# Patient Record
Sex: FEMALE | NOT HISPANIC OR LATINO | Employment: FULL TIME | URBAN - METROPOLITAN AREA
[De-identification: names, ages, dates, MRNs, and addresses within clinical notes are randomized per-mention and may not be internally consistent; named-entity substitution may affect disease eponyms.]

---

## 2021-04-18 ENCOUNTER — OFFICE VISIT (OUTPATIENT)
Dept: URGENT CARE | Facility: URGENT CARE | Age: 21
End: 2021-04-18
Payer: COMMERCIAL

## 2021-04-18 VITALS
HEIGHT: 67 IN | TEMPERATURE: 99.7 F | DIASTOLIC BLOOD PRESSURE: 70 MMHG | BODY MASS INDEX: 23.54 KG/M2 | OXYGEN SATURATION: 97 % | RESPIRATION RATE: 16 BRPM | SYSTOLIC BLOOD PRESSURE: 110 MMHG | WEIGHT: 150 LBS | HEART RATE: 82 BPM

## 2021-04-18 DIAGNOSIS — J02.0 STREP PHARYNGITIS: Primary | ICD-10-CM

## 2021-04-18 LAB
DEPRECATED S PYO AG THROAT QL EIA: POSITIVE
SPECIMEN SOURCE: ABNORMAL

## 2021-04-18 PROCEDURE — 87880 STREP A ASSAY W/OPTIC: CPT | Performed by: FAMILY MEDICINE

## 2021-04-18 PROCEDURE — 99203 OFFICE O/P NEW LOW 30 MIN: CPT | Performed by: FAMILY MEDICINE

## 2021-04-18 RX ORDER — PENICILLIN V POTASSIUM 500 MG/1
500 TABLET, FILM COATED ORAL 2 TIMES DAILY
Qty: 20 TABLET | Refills: 0 | Status: SHIPPED | OUTPATIENT
Start: 2021-04-18 | End: 2021-04-28

## 2021-04-18 RX ORDER — ISOTRETINOIN 40 MG/1
40 CAPSULE ORAL 2 TIMES DAILY
COMMUNITY
End: 2023-05-17

## 2021-04-18 ASSESSMENT — MIFFLIN-ST. JEOR: SCORE: 1478.03

## 2021-04-18 NOTE — LETTER
WORK NOTE    Date: 4/18/2021      Name: Elsi Eagle                       YOB: 2000    Medical Record Number: [id number]    The patient was seen today at: [facility]    Patient seen today in clinic.  Due to infection- patient needs to remain off work until Tuesday AM.        _________________________  April Damon MD

## 2021-04-18 NOTE — PROGRESS NOTES
"    Assessment & Plan     Strep pharyngitis    - Streptococcus A Rapid Scr w Reflx to PCR  - penicillin V (VEETID) 500 MG tablet; Take 1 tablet (500 mg) by mouth 2 times daily for 10 days    +strep.  Treat with PEN VK bid x 10 days.  Increased fluids and rest.  Follow-up if no change or worsening sx.  Work note provided.    20 minutes spent on the date of the encounter doing chart review, review of test results, interpretation of tests, patient visit and documentation     April Damon MD  [facility]    Vahid Calzada is a 20 year old who presents for the following health issues     HPI     ST x 24 hours.  B ear pain.  No fever or chills.  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      Review of Systems   Constitutional, HEENT, cardiovascular, pulmonary, GI, , musculoskeletal, neuro, skin, endocrine and psych systems are negative, except as otherwise noted.      Objective    /70   Pulse 82   Temp 99.7  F (37.6  C) (Oral)   Resp 16   Ht 1.702 m (5' 7\")   Wt 68 kg (150 lb)   LMP 04/11/2021   SpO2 97%   Breastfeeding No   BMI 23.49 kg/m    Body mass index is 23.49 kg/m .  Physical Exam   GENERAL: healthy, alert and no distress  EYES: Eyes grossly normal to inspection, PERRL and conjunctivae and sclerae normal  HENT: ear canals and TM's normal, + erythema of posterior pharynx  NECK: no adenopathy, no asymmetry, masses, or scars and thyroid normal to palpation  MS: no gross musculoskeletal defects noted, no edema  PSYCH: mentation appears normal, affect normal/bright    Results for orders placed or performed in visit on 04/18/21 (from the past 24 hour(s))   Streptococcus A Rapid Scr w Reflx to PCR    Specimen: Throat   Result Value Ref Range    Strep Specimen Description Throat     Streptococcus Group A Rapid Screen Positive (A) NEG^Negative               "

## 2021-07-09 ENCOUNTER — OFFICE VISIT (OUTPATIENT)
Dept: URGENT CARE | Facility: URGENT CARE | Age: 21
End: 2021-07-09
Payer: COMMERCIAL

## 2021-07-09 VITALS
HEIGHT: 67 IN | RESPIRATION RATE: 12 BRPM | BODY MASS INDEX: 23.54 KG/M2 | WEIGHT: 150 LBS | SYSTOLIC BLOOD PRESSURE: 110 MMHG | TEMPERATURE: 98.1 F | HEART RATE: 70 BPM | DIASTOLIC BLOOD PRESSURE: 70 MMHG

## 2021-07-09 DIAGNOSIS — L50.9 HIVES: Primary | ICD-10-CM

## 2021-07-09 LAB
DEPRECATED S PYO AG THROAT QL EIA: NEGATIVE
SPECIMEN SOURCE: NORMAL
SPECIMEN SOURCE: NORMAL
STREP GROUP A PCR: NOT DETECTED

## 2021-07-09 PROCEDURE — 99213 OFFICE O/P EST LOW 20 MIN: CPT | Mod: 25 | Performed by: INTERNAL MEDICINE

## 2021-07-09 PROCEDURE — 87651 STREP A DNA AMP PROBE: CPT | Performed by: INTERNAL MEDICINE

## 2021-07-09 PROCEDURE — 96372 THER/PROPH/DIAG INJ SC/IM: CPT | Performed by: INTERNAL MEDICINE

## 2021-07-09 RX ORDER — PREDNISONE 20 MG/1
40 TABLET ORAL DAILY
Qty: 10 TABLET | Refills: 0 | Status: SHIPPED | OUTPATIENT
Start: 2021-07-09 | End: 2021-07-14

## 2021-07-09 RX ORDER — METHYLPREDNISOLONE SODIUM SUCCINATE 40 MG/ML
40 INJECTION, POWDER, LYOPHILIZED, FOR SOLUTION INTRAMUSCULAR; INTRAVENOUS ONCE
Status: COMPLETED | OUTPATIENT
Start: 2021-07-09 | End: 2021-07-09

## 2021-07-09 RX ORDER — METRONIDAZOLE 250 MG/1
250 TABLET ORAL 3 TIMES DAILY
COMMUNITY
End: 2023-05-17

## 2021-07-09 RX ADMIN — METHYLPREDNISOLONE SODIUM SUCCINATE 40 MG: 40 INJECTION, POWDER, LYOPHILIZED, FOR SOLUTION INTRAMUSCULAR; INTRAVENOUS at 14:30

## 2021-07-09 ASSESSMENT — MIFFLIN-ST. JEOR: SCORE: 1478.03

## 2021-07-09 NOTE — PATIENT INSTRUCTIONS
Screen strep    treatment for hives with steroid shot & start oral prednisone 40 mg 5 day burst tomorrow.  Zyrtec 10 mg daily 5 days.  Then benadryl 50 mg at night 5 days    Unsure etiology  Screen strep  Viral  Allergic reaction - foods, etc    Read handout     Recheck primary 1-2 weeks      Patient Education     Hives (Adult)  Hives are pink or red bumps on the skin. These bumps are also known as wheals. The bumps can itch, burn, or sting. Hives can occur anywhere on the body. They vary in size and shape and can form in clusters. Individual hives can appear and go away quickly. New hives may develop as old ones fade. Hives are common and usually harmless. They are not contagious. Occasionally, hives are a sign of a serious allergy.   Hives are often caused by an allergic reaction. They may occur from:     Certain foods, such as shellfish, nuts, tomatoes, or berries    Contact with something in the environment, such as pollens, animals, or mold    Certain medicines    Sun or cold air    Viral infections, such as a cold, the flu, or strep throat  If the hives continue to come and go over many weeks without any other symptoms (chronic hives), the cause may be very hard to figure out.   You may be prescribed medicines to ease swelling and itching. Follow all instructions when using these medicines. The hives will usually fade in a few days. But they can last for weeks or months.   Home care   Follow these tips:    Try to find the cause of the hives and eliminate it. Discuss possible causes with your healthcare provider. Your healthcare provider may ask you to keep track of the food you eat and your lifestyle to help find the cause of the hives.    Don t scratch the hives. Scratching will delay healing. To reduce itching, apply cool, wet compresses to the skin.    Dress in soft, loose cotton clothing.    Don t bathe in hot water. This can make the itching worse.    Apply an ice pack or cool pack wrapped in a thin towel  to your skin. This will help reduce redness and itching. But if your hives were caused by exposure to cold, then do not apply more cold to them.    You may use over-the counter antihistamines to reduce itching. Some older antihistamines, such as diphenhydramine and chlorpheniramine, are inexpensive. But they need to be taken often and may make you sleepy. They are best used at bedtime. Don t use diphenhydramine if you have glaucoma or have trouble urinating because of an enlarged prostate. Newer antihistamines, such as loratadine, cetirizine, levocetirizine, and fexofenadine, are generally more expensive. But they tend to have fewer side effects. They can be taken less often.    Another type of antihistamine is used to treat heartburn. This type includes nizatidine, famotidine, and cimetidine. These are sometimes used along with the above antihistamines if a single medicine is not working.    If the hives are severe and you do not respond well to other medicines, you may be given a steroid, such as prednisone, to take for a short time. Follow all instructions carefully when taking this medicine. Tell your healthcare provider about any side effects.  Follow-up care   Follow up with your healthcare provider if your symptoms don't get better in 2 days. Ask your provider about allergy testing if you have had a severe reaction or have had several episodes of hives. Allergy testing may help figure out what you are allergic to. You may need blood tests, a urine test, or skin tests.   When to seek medical advice   Call your healthcare provider right away if any of these occur:     Fever of 100.4 F (38.0 C) or higher, or as directed by your healthcare provider    Redness, swelling, or pain    Foul-smelling fluid coming from the rash  Call 911  Call 911 if any of the following occur:     Swelling of the face, throat, or tongue    Trouble breathing or swallowing    Dizziness, weakness, or fainting  Prasanth last reviewed this  educational content on 6/1/2019 2000-2021 [employer]. All rights reserved. This information is not intended as a substitute for professional medical care. Always follow your healthcare professional's instructions.

## 2021-07-09 NOTE — PROGRESS NOTES
ASSESSMENT AND PLAN:      ICD-10-CM    1. Hives  L50.9 Streptococcus A Rapid Scr w Reflx to PCR     predniSONE (DELTASONE) 20 MG tablet     methylPREDNISolone sodium succinate (solu-MEDROL) injection 40 mg     Group A Streptococcus PCR Throat Swab       Patient Instructions   Screen strep    treatment for hives with steroid shot & start oral prednisone 40 mg 5 day burst tomorrow.  Zyrtec 10 mg daily 5 days.  Then benadryl 50 mg at night 5 days    Unsure etiology  Screen strep  Viral  Allergic reaction - foods, etc    Read handout     Recheck primary 1-2 weeks      Patient Education     Hives (Adult)  Hives are pink or red bumps on the skin. These bumps are also known as wheals. The bumps can itch, burn, or sting. Hives can occur anywhere on the body. They vary in size and shape and can form in clusters. Individual hives can appear and go away quickly. New hives may develop as old ones fade. Hives are common and usually harmless. They are not contagious. Occasionally, hives are a sign of a serious allergy.   Hives are often caused by an allergic reaction. They may occur from:     Certain foods, such as shellfish, nuts, tomatoes, or berries    Contact with something in the environment, such as pollens, animals, or mold    Certain medicines    Sun or cold air    Viral infections, such as a cold, the flu, or strep throat  If the hives continue to come and go over many weeks without any other symptoms (chronic hives), the cause may be very hard to figure out.   You may be prescribed medicines to ease swelling and itching. Follow all instructions when using these medicines. The hives will usually fade in a few days. But they can last for weeks or months.   Home care   Follow these tips:    Try to find the cause of the hives and eliminate it. Discuss possible causes with your healthcare provider. Your healthcare provider may ask you to keep track of the food you eat and your lifestyle to help find the cause of the  hives.    Don t scratch the hives. Scratching will delay healing. To reduce itching, apply cool, wet compresses to the skin.    Dress in soft, loose cotton clothing.    Don t bathe in hot water. This can make the itching worse.    Apply an ice pack or cool pack wrapped in a thin towel to your skin. This will help reduce redness and itching. But if your hives were caused by exposure to cold, then do not apply more cold to them.    You may use over-the counter antihistamines to reduce itching. Some older antihistamines, such as diphenhydramine and chlorpheniramine, are inexpensive. But they need to be taken often and may make you sleepy. They are best used at bedtime. Don t use diphenhydramine if you have glaucoma or have trouble urinating because of an enlarged prostate. Newer antihistamines, such as loratadine, cetirizine, levocetirizine, and fexofenadine, are generally more expensive. But they tend to have fewer side effects. They can be taken less often.    Another type of antihistamine is used to treat heartburn. This type includes nizatidine, famotidine, and cimetidine. These are sometimes used along with the above antihistamines if a single medicine is not working.    If the hives are severe and you do not respond well to other medicines, you may be given a steroid, such as prednisone, to take for a short time. Follow all instructions carefully when taking this medicine. Tell your healthcare provider about any side effects.  Follow-up care   Follow up with your healthcare provider if your symptoms don't get better in 2 days. Ask your provider about allergy testing if you have had a severe reaction or have had several episodes of hives. Allergy testing may help figure out what you are allergic to. You may need blood tests, a urine test, or skin tests.   When to seek medical advice   Call your healthcare provider right away if any of these occur:     Fever of 100.4 F (38.0 C) or higher, or as directed by your  "healthcare provider    Redness, swelling, or pain    Foul-smelling fluid coming from the rash  Call 911  Call 911 if any of the following occur:     Swelling of the face, throat, or tongue    Trouble breathing or swallowing    Dizziness, weakness, or fainting  Prasanth last reviewed this educational content on 6/1/2019 2000-2021 [employer]. All rights reserved. This information is not intended as a substitute for professional medical care. Always follow your healthcare professional's instructions.             Return in about 1 week (around 7/16/2021).        Alexia Chavez MD  [facility]    Subjective     Elsi Eagle is a 20 year old who presents for Patient presents with:  Urgent Care  Derm Problem: rash on neck and all over except feet. Very itchy, started Tuesday.     an established patient of [facility].    Rash    Onset of rash was 3 day(s) ago.   Course of illness is worsening.    Current and Associated symptoms: itching and red   Location of the rash: lower legs , then to trunk & arms  today neck & face    Initially thought mosquito bites  Previous history of a similar rash? No  Recent exposure history: none known  Denies exposure to: dietary change, environmental allergens, new household products, new skincare products, strep and viral illness    Treatment measures tried include: none    No shortness of breath   No swelling tongue or throat sensation        Objective    /70   Pulse 70   Temp 98.1  F (36.7  C) (Oral)   Resp 12   Ht 1.702 m (5' 7\")   Wt 68 kg (150 lb)   LMP 05/14/2021   Breastfeeding No   BMI 23.49 kg/m    Physical Exam  Vitals signs reviewed.   Constitutional:       Appearance: Normal appearance. She is not ill-appearing.   Cardiovascular:      Rate and Rhythm: Normal rate and regular rhythm.      Pulses: Normal pulses.      Heart sounds: Normal heart sounds.   Pulmonary:      Effort: Pulmonary effort is normal.      Breath " sounds: Normal breath sounds. No wheezing.   Skin:     Findings: Rash present.      Comments: Multiple varying sizes of red patches, some coalescing.     Neurological:      Mental Status: She is alert.                    Results for orders placed or performed in visit on 07/09/21 (from the past 24 hour(s))   Streptococcus A Rapid Scr w Reflx to PCR    Specimen: Throat   Result Value Ref Range    Strep Specimen Description Throat     Streptococcus Group A Rapid Screen Negative NEG^Negative

## 2021-07-29 ENCOUNTER — HOSPITAL ENCOUNTER (EMERGENCY)
Facility: CLINIC | Age: 21
Discharge: HOME | End: 2021-07-29
Payer: COMMERCIAL

## 2021-07-29 VITALS
WEIGHT: 150 LBS | TEMPERATURE: 98.5 F | SYSTOLIC BLOOD PRESSURE: 101 MMHG | HEART RATE: 87 BPM | BODY MASS INDEX: 23.49 KG/M2 | OXYGEN SATURATION: 98 % | RESPIRATION RATE: 16 BRPM | DIASTOLIC BLOOD PRESSURE: 62 MMHG

## 2021-07-29 NOTE — ED TRIAGE NOTES
Pt is ~ 6 1/2 weeks pregnant. She has been taking medication to voluntarily interrupt this pregnancy. She started the med to open her cervix on Monday.  On Tuesday she took the other 4 pills. She had heavy bleeding earlier today, which she reports is moderate at this time. She tells me that her cramping is her major problem, it is not relieved with Ibuprofen 800 mg.

## 2021-07-29 NOTE — ED PROVIDER NOTES
"ED Provider Note  [facility]      History     Chief Complaint   Patient presents with     Vaginal Bleeding     Pt is ~ 6 1/2 weeks pregnant. She has been taking medication to voluntarily interrupt this pregnancy. She started the med to open her cervix on Monday.  On Tuesday she took the other 4 pills. She had heavy bleeding earlier today, which she reports is moderate at this time. She tells me that her cramping is her major problem, it is not relieved with Ibuprofen.      HPI  Elsi Eagle is a (6.5 weeks pregnant) 20 year old female with a PMH of adjustment disorder with depressed mood who presents to the ED today complaining of vaginal bleeding.  Patient is on a medication to voluntarily terminate this pregnancy. ***    Past Medical History  No past medical history on file.  No past surgical history on file.  ISOtretinoin (ACCUTANE) 40 MG capsule  metroNIDAZOLE (FLAGYL) 250 MG tablet      No Known Allergies  Family History  No family history on file.  Social History   Social History     Tobacco Use     Smoking status: Never Smoker     Smokeless tobacco: Never Used   Substance Use Topics     Alcohol use: Not on file     Drug use: Not on file      Past medical history, past surgical history, medications, allergies, family history, and social history were reviewed with the patient. No additional pertinent items.       Review of Systems  {Complete vs limited ROS:115421::\"A complete review of systems was performed with pertinent positives and negatives noted in the HPI, and all other systems negative.\"}    Physical Exam   BP: 101/62  Pulse: 87  Temp: 98.5  F (36.9  C)  Resp: 16  Height:  (5 ft 7 inches)  Weight: 68 kg (150 lb)  SpO2: 98 %  Physical Exam  ***  ED Course      Procedures       {ED Course Selections:885457}       No results found for any visits on 07/29/21.  Medications - No data to display     Assessments & Plan (with Medical Decision Making)   ***    I have reviewed the " nursing notes. I have reviewed the findings, diagnosis, plan and need for follow up with the patient.    New Prescriptions    No medications on file       Final diagnoses:   None       --  ***  [facility] EMER[facility]ENCY DEPARTMENT  7/29/2021

## 2021-09-10 ENCOUNTER — OFFICE VISIT (OUTPATIENT)
Dept: FAMILY MEDICINE | Facility: CLINIC | Age: 21
End: 2021-09-10
Payer: COMMERCIAL

## 2021-09-10 VITALS
WEIGHT: 151 LBS | DIASTOLIC BLOOD PRESSURE: 60 MMHG | TEMPERATURE: 98.2 F | SYSTOLIC BLOOD PRESSURE: 100 MMHG | HEART RATE: 73 BPM | OXYGEN SATURATION: 97 % | RESPIRATION RATE: 14 BRPM | BODY MASS INDEX: 23.65 KG/M2

## 2021-09-10 DIAGNOSIS — R07.0 THROAT PAIN: ICD-10-CM

## 2021-09-10 DIAGNOSIS — J02.0 STREPTOCOCCAL PHARYNGITIS: Primary | ICD-10-CM

## 2021-09-10 LAB — DEPRECATED S PYO AG THROAT QL EIA: POSITIVE

## 2021-09-10 PROCEDURE — 87880 STREP A ASSAY W/OPTIC: CPT | Performed by: PHYSICIAN ASSISTANT

## 2021-09-10 PROCEDURE — 99213 OFFICE O/P EST LOW 20 MIN: CPT | Performed by: PHYSICIAN ASSISTANT

## 2021-09-10 RX ORDER — AMOXICILLIN 500 MG/1
500 CAPSULE ORAL 2 TIMES DAILY
Qty: 20 CAPSULE | Refills: 0 | Status: SHIPPED | OUTPATIENT
Start: 2021-09-10 | End: 2021-09-20

## 2021-09-10 NOTE — PROGRESS NOTES
Assessment & Plan     Streptococcal pharyngitis  Throat pain  Rapid strep returned positive. Start Amoxicillin 500 mg BID x 10 days. Work note provided. Tylenol and/or ibuprofen PRN. Push fluids. Change toothbrush. Return to care if not improving or worsening.   - Streptococcus A Rapid Screen w/Reflex to PCR - Clinic Collect  - amoxicillin (AMOXIL) 500 MG capsule; Take 1 capsule (500 mg) by mouth 2 times daily for 10 days    Return for if not improving or worsening.    AISHWARYA Munoz [facility]    Vahid Calzada is a 20 year old who presents for the following health issues     HPI     RESPIRATORY SYMPTOMS      Duration: 2 days    Description  sore throat    Severity: moderate    Accompanying signs and symptoms: throat squeezing.     History (predisposing factors):  strep exposure    Precipitating or alleviating factors:   Waking up and going to sleep.None  Therapies tried and outcome:  Vitamin C and home remedy      Review of Systems   Constitutional, HEENT, cardiovascular, pulmonary, gi and gu systems are negative, except as otherwise noted.      Objective    /60 (BP Location: Left arm, Patient Position: Chair, Cuff Size: Adult Regular)   Pulse 73   Temp 98.2  F (36.8  C) (Tympanic)   Resp 14   Wt 68.5 kg (151 lb)   LMP 09/10/2021 (Exact Date)   SpO2 97%   Breastfeeding Unknown   BMI 23.65 kg/m    Body mass index is 23.65 kg/m .  Physical Exam  Vitals and nursing note reviewed.   Constitutional:       Appearance: Normal appearance.   HENT:      Head: Normocephalic and atraumatic.      Right Ear: Tympanic membrane, ear canal and external ear normal.      Left Ear: Tympanic membrane, ear canal and external ear normal.      Mouth/Throat:      Mouth: Mucous membranes are moist.      Pharynx: Posterior oropharyngeal erythema present. No oropharyngeal exudate.   Eyes:      Conjunctiva/sclera: Conjunctivae normal.   Cardiovascular:      Rate and Rhythm: Normal rate  and regular rhythm.      Heart sounds: Normal heart sounds.   Pulmonary:      Effort: Pulmonary effort is normal.      Breath sounds: Normal breath sounds.   Musculoskeletal:      Cervical back: Neck supple. No tenderness.   Neurological:      General: No focal deficit present.      Mental Status: She is alert.   Psychiatric:         Mood and Affect: Mood normal.         Behavior: Behavior normal.            Results for orders placed or performed in visit on 09/10/21   Streptococcus A Rapid Screen w/Reflex to PCR - Clinic Collect     Status: Abnormal    Specimen: Throat; Swab   Result Value Ref Range    Group A Strep antigen Positive (A) Negative

## 2021-09-10 NOTE — LETTER
[facility]  [address] [place] [address]  [phone]      September 10, 2021    RE:  Elsi Eagle                                                                                                                                                       [address]   [facility] [id number]            To whom it may concern:    Elsi SHEPPARD Jacintochin is under my professional care for strep pharyngitis. She should be away from work for two days while starting antibiotics.         Sincerely,        AISHWARYA Munoz [facility]

## 2021-09-10 NOTE — PATIENT INSTRUCTIONS
Patient Education     Pharyngitis: Strep (Confirmed)    You have had a positive test for strep throat. Strep throat is a contagious illness. It's spread by coughing, kissing, sharing glasses or eating utensils, or by touching others after touching your mouth or nose. Symptoms include throat pain that is worse with swallowing, aching all over, headache, swollen lymph nodes at the front of the neck, and red swollen tonsils sometimes with white patches and fever. It's treated with antibiotic medicine. This should help you start to feel better in 1 to 2 days.   Home care    Rest at home. Drink plenty of fluids so you won't get dehydrated.    No work or school for the first 2 days of taking the antibiotics. You can then return to school or work if you are feeling better, have been taking the antibiotic for at least 24 hours and don't have a fever.     Take antibiotic medicine for the full 10 days, even if you feel better. This is very important to ensure the infection is treated completely. It's also important to prevent medicine-resistant germs from developing. If you were given an antibiotic shot, you don't need any more antibiotics.    You may use acetaminophen or ibuprofen to control pain or fever, unless another medicine was prescribed for this. Talk with your healthcare provider before taking these medicines if you have chronic liver or kidney disease or if you have had a stomach ulcer or gastrointestinal bleeding.    Throat lozenges or sprays help reduce pain. Gargling with warm saltwater will also reduce throat pain. Dissolve 1/2 teaspoon of salt in 1 glass of warm water. This may be useful just before meals.     Soft foods and cool or warm fluids are best. Don't eat salty or spicy foods.    Follow-up care  Follow up with your healthcare provider or our staff if you don't get better over the next week.   When to get medical advice  Call your healthcare provider right away or get immediate medical care if any of  these occur:     Fever of 100.4 F (38 C) or higher, or as directed by your healthcare provider    New or worsening ear pain, sinus pain, or headache    Painful lumps in the back of neck    Stiff neck    Lymph nodes getting larger or becoming soft in the middle    You have trouble swallowing liquids or you can't open your mouth wide because of throat pain    Signs of dehydration. These include very dark urine or no urine, sunken eyes, and dizziness.    Noisy breathing    Muffled voice    Rash  Call 911  Call 911right away if you:     Have trouble breathing    Can't swallow or talk    Prevention  Here are steps you can take to help prevent an infection:     Wash your hands often with soap and clean, running water for at least 20 seconds.    Don t have close contact with people who have sore throats, colds, or other upper respiratory infections.    Don t smoke, and stay away from secondhand smoke.  [email or web address] last reviewed this educational content on 3/1/2020    5903-3907 [employer]. All rights reserved. This information is not intended as a substitute for professional medical care. Always follow your healthcare professional's instructions.

## 2022-06-01 ENCOUNTER — HOSPITAL ENCOUNTER (EMERGENCY)
Facility: CLINIC | Age: 22
Discharge: HOME | End: 2022-06-01
Attending: FAMILY MEDICINE | Admitting: FAMILY MEDICINE
Payer: COMMERCIAL

## 2022-06-01 VITALS
WEIGHT: 151 LBS | BODY MASS INDEX: 23.7 KG/M2 | HEIGHT: 67 IN | DIASTOLIC BLOOD PRESSURE: 62 MMHG | RESPIRATION RATE: 16 BRPM | TEMPERATURE: 98.1 F | OXYGEN SATURATION: 96 % | HEART RATE: 109 BPM | SYSTOLIC BLOOD PRESSURE: 108 MMHG

## 2022-06-01 DIAGNOSIS — L50.9 URTICARIA: ICD-10-CM

## 2022-06-01 DIAGNOSIS — Z3A.18 18 WEEKS GESTATION OF PREGNANCY: ICD-10-CM

## 2022-06-01 PROCEDURE — 76815 OB US LIMITED FETUS(S): CPT | Performed by: FAMILY MEDICINE

## 2022-06-01 PROCEDURE — 76815 OB US LIMITED FETUS(S): CPT | Mod: 26 | Performed by: FAMILY MEDICINE

## 2022-06-01 PROCEDURE — 96374 THER/PROPH/DIAG INJ IV PUSH: CPT | Performed by: FAMILY MEDICINE

## 2022-06-01 PROCEDURE — 99284 EMERGENCY DEPT VISIT MOD MDM: CPT | Mod: 25 | Performed by: FAMILY MEDICINE

## 2022-06-01 PROCEDURE — 96375 TX/PRO/DX INJ NEW DRUG ADDON: CPT | Performed by: FAMILY MEDICINE

## 2022-06-01 PROCEDURE — 99285 EMERGENCY DEPT VISIT HI MDM: CPT | Mod: 25 | Performed by: FAMILY MEDICINE

## 2022-06-01 PROCEDURE — 250N000011 HC RX IP 250 OP 636: Performed by: FAMILY MEDICINE

## 2022-06-01 RX ORDER — DIPHENHYDRAMINE HCL 25 MG
25 TABLET ORAL EVERY 6 HOURS PRN
Qty: 20 TABLET | Refills: 0 | Status: SHIPPED | OUTPATIENT
Start: 2022-06-01 | End: 2023-05-17

## 2022-06-01 RX ORDER — METHYLPREDNISOLONE 4 MG
TABLET, DOSE PACK ORAL
Qty: 21 TABLET | Refills: 0 | Status: SHIPPED | OUTPATIENT
Start: 2022-06-01 | End: 2023-05-17

## 2022-06-01 RX ORDER — METHYLPREDNISOLONE SODIUM SUCCINATE 125 MG/2ML
125 INJECTION, POWDER, LYOPHILIZED, FOR SOLUTION INTRAMUSCULAR; INTRAVENOUS ONCE
Status: COMPLETED | OUTPATIENT
Start: 2022-06-01 | End: 2022-06-01

## 2022-06-01 RX ORDER — DIPHENHYDRAMINE HYDROCHLORIDE 50 MG/ML
25 INJECTION INTRAMUSCULAR; INTRAVENOUS ONCE
Status: COMPLETED | OUTPATIENT
Start: 2022-06-01 | End: 2022-06-01

## 2022-06-01 RX ORDER — PRENATAL VIT/IRON FUM/FOLIC AC 27MG-0.8MG
1 TABLET ORAL DAILY
COMMUNITY
End: 2023-12-07

## 2022-06-01 RX ORDER — FAMOTIDINE 20 MG/1
20 TABLET, FILM COATED ORAL 2 TIMES DAILY
Qty: 10 TABLET | Refills: 0 | Status: SHIPPED | OUTPATIENT
Start: 2022-06-01 | End: 2022-06-06

## 2022-06-01 RX ADMIN — DIPHENHYDRAMINE HYDROCHLORIDE 25 MG: 50 INJECTION, SOLUTION INTRAMUSCULAR; INTRAVENOUS at 11:16

## 2022-06-01 RX ADMIN — FAMOTIDINE 20 MG: 10 INJECTION INTRAVENOUS at 11:32

## 2022-06-01 RX ADMIN — METHYLPREDNISOLONE SODIUM SUCCINATE 125 MG: 125 INJECTION, POWDER, FOR SOLUTION INTRAMUSCULAR; INTRAVENOUS at 11:15

## 2022-06-01 NOTE — ED PROVIDER NOTES
"ED Provider Note  [facility]      History     Chief Complaint   Patient presents with     Rash     Rash that initially started on her abdomen that has spread all over her body , rash is  very itchy and patient woke up this morning with puffy eyes.      HPI  Elsi Eagle is a healthy 21 year old female  at 18 weeks who presents with a spreading urticarial rash. Symptoms started spontaneously yesterday morning with rash on her abdomen that became increasingly itchy and spread to her back, legs, and chest over the course of the day. She had similar symptoms 1 year ago during a previous pregnancy and required prednisone while in the ED with symptom resolution. She is requesting medications again for symptom management.    PMH  Similar urticarial rash during pregnancy one year prior    Meds  Prenatal vitamin    Allergies  NKA     Review of Systems  Rash spreading from abdomen to chest, back, legs, and arms. Endorses headache, nausea, and urinary frequency associated with pregnancy. Denies fever, vomiting, diarrhea, constipation, change in appetite, fatigue, weakness.    Physical Exam   BP: 108/62  Pulse: 109  Temp: 98.1  F (36.7  C)  Resp: 16  Height: 170.2 cm (5' 7\")  Weight: 68.5 kg (151 lb)  SpO2: 96 %  Physical Exam  Constitutional:       Appearance: Normal appearance.   HENT:      Head: Normocephalic.      Mouth/Throat:      Mouth: Mucous membranes are moist.      Pharynx: Oropharynx is clear.   Eyes:      Conjunctiva/sclera: Conjunctivae normal.   Cardiovascular:      Rate and Rhythm: Normal rate and regular rhythm.      Pulses: Normal pulses.      Heart sounds: Normal heart sounds.   Pulmonary:      Effort: Pulmonary effort is normal.      Breath sounds: Normal breath sounds.   Abdominal:      General: Bowel sounds are normal.      Comments: Abdomen appropriately distended for pregnancy   Skin:     General: Skin is warm and dry.      Capillary Refill: Capillary refill takes " less than 2 seconds.      Coloration: Skin is not jaundiced.      Findings: Rash present.      Comments: Urticarial patches across lower abdomen, neck and face, upper and lower back, medial thighs, and shins   Neurological:      Mental Status: She is alert. Mental status is at baseline.   Psychiatric:         Behavior: Behavior normal.       ED Course      Procedures  Results for orders placed during the hospital encounter of 06/01/22    POC US OB TRANSABDOMINAL LIMITED    Impression  Limited Bedside Transabdominal ultrasound for evaluation of IUP  Performed any interpreted by me.    Indication: Urticarial rash, 18 weeks pregnant  Findings:  The lower abdomen was interrogated with a curvilinear probe. The uterus was identified.  Within the uterus there is a moving fetus with visible heart rate with FHR of 130    Impression: Intrauterine pregnancy          Results for orders placed or performed during the hospital encounter of 06/01/22   POC US OB TRANSABDOMINAL LIMITED     Status: None    Impression    Limited Bedside Transabdominal ultrasound for evaluation of IUP        Performed any interpreted by me.    Indication: Urticarial rash, 18 weeks pregnant  Findings:  The lower abdomen was interrogated with a curvilinear probe. The uterus was identified.   Within the uterus there is a moving fetus with visible heart rate with FHR of 130    Impression: Intrauterine pregnancy     Medications   methylPREDNISolone sodium succinate (solu-MEDROL) injection 125 mg (125 mg Intravenous Given 6/1/22 1115)   diphenhydrAMINE (BENADRYL) injection 25 mg (25 mg Intravenous Given 6/1/22 1116)   famotidine (PEPCID) injection 20 mg (20 mg Intravenous Given 6/1/22 1132)        Assessments & Plan (with Medical Decision Making)   Elsi Eagle is a previously healthy 21 year old female who is 18 weeks pregnant and presents with a one day history of spreading urticarial rash. She experienced a similar rash early in a pregnancy in 2021,  which resolved with IM steroids. Her presentation is most concerning for idiopathic urticaria, less concerning for PUPPP due to the timing of the rash, and Pemphigoid Gestationis is least likely due to the lack of vesicles.    Plan:  - Prednisone 125 mg IM   - Monitor fetal heart tones  - Home with Benadryl, Medrol, famotidine      Shiraz Rising [place], MS4  [facility] [employer]      I have reviewed the nursing notes. I have reviewed the findings, diagnosis, plan and need for follow up with the patient.    Discharge Medication List as of 6/1/2022 12:28 PM      START taking these medications    Details   diphenhydrAMINE (BENADRYL) 25 MG tablet Take 1 tablet (25 mg) by mouth every 6 hours as needed for itching or allergies, Disp-20 tablet, R-0, Local Print      famotidine (PEPCID) 20 MG tablet Take 1 tablet (20 mg) by mouth 2 times daily for 5 days, Disp-10 tablet, R-0, Local Print      methylPREDNISolone (MEDROL DOSEPAK) 4 MG tablet therapy pack Follow Package Directions, Disp-21 tablet, R-0, Local Print             Final diagnoses:   Urticaria   18 weeks gestation of pregnancy     --    ED Attending Physician Attestation    I Shankar Nguyen MD, cared for this patient with the Medical Student. I performed, or re-performed, the physical exam and medical decision-making. I have verified the accuracy of all the medical student findings and documentation above, and have edited as necessary.    Summary of HPI, PE, ED Course   Patient is a 21 year old female evaluated in the emergency department for spontaneous onset of pruritic raised rash. Exam notable for multiple wheals on trunk and extremities consistent with urticaria, no involvement of palms or soles, no mucous membrane involvement, no target lesions, no vesicles, oropharynx clear, breath sounds clear, no signs of anaphylaxis or airway involvement. ED course notable for our discussion with the patient regarding the risks and benefits of using steroids and  antihistamines to treat rash in the context of second trimester pregnancy, patient agreed to this treatment and patient improved substantially with administration of steroids and antihistamines. After the completion of care in the emergency department, the patient was discharged.    Critical Care & Procedures  Not applicable.    Medical Decision Making  The medical record was reviewed and interpreted.  Managed outpatient prescription medications.      Shankar Nguyen MD  Emergency Medicine       --  Shankar Nguyen MD  [facility] EMERGENCY DEPARTMENT  6/1/2022     Shankar Nguyen MD  06/01/22 1559

## 2022-06-01 NOTE — DISCHARGE INSTRUCTIONS
Thank you for choosing [facility].     Please closely monitor for further symptoms. Return to the Emergency Department if you develop any new or worsening signs or symptoms.    If you received any opiate pain medications or sedatives during your visit, please do not drive for at least 8 hours.     Labs, cultures or final xray interpretations may still need to be reviewed.  We will call you if your plan of care needs to be changed.    Please follow up with your primary care physician or clinic.

## 2022-07-17 ENCOUNTER — HEALTH MAINTENANCE LETTER (OUTPATIENT)
Age: 22
End: 2022-07-17

## 2022-09-25 ENCOUNTER — HEALTH MAINTENANCE LETTER (OUTPATIENT)
Age: 22
End: 2022-09-25

## 2023-02-03 ENCOUNTER — LAB (OUTPATIENT)
Dept: LAB | Facility: CLINIC | Age: 23
End: 2023-02-03
Payer: COMMERCIAL

## 2023-02-03 ENCOUNTER — E-VISIT (OUTPATIENT)
Dept: URGENT CARE | Facility: CLINIC | Age: 23
End: 2023-02-03
Payer: COMMERCIAL

## 2023-02-03 DIAGNOSIS — N89.8 VAGINAL DISCHARGE: ICD-10-CM

## 2023-02-03 DIAGNOSIS — N89.8 VAGINAL DISCHARGE: Primary | ICD-10-CM

## 2023-02-03 PROCEDURE — 99421 OL DIG E/M SVC 5-10 MIN: CPT | Performed by: PHYSICIAN ASSISTANT

## 2023-02-03 PROCEDURE — 87491 CHLMYD TRACH DNA AMP PROBE: CPT

## 2023-02-03 PROCEDURE — 87591 N.GONORRHOEAE DNA AMP PROB: CPT

## 2023-02-03 NOTE — PATIENT INSTRUCTIONS
Thank you for choosing us for your care. Given your symptoms, I would like you to do a lab-only visit to determine what is causing them.  I have placed the orders.  Please schedule an appointment with the lab right here in [facility], or call [phone].  I will let you know when the results are back and next steps to take.  We have ordered a wet prep and STD testing for you.    EDGAR MendosaC

## 2023-02-04 LAB
C TRACH DNA SPEC QL NAA+PROBE: NEGATIVE
N GONORRHOEA DNA SPEC QL NAA+PROBE: NEGATIVE

## 2023-02-12 ENCOUNTER — APPOINTMENT (OUTPATIENT)
Dept: ULTRASOUND IMAGING | Facility: CLINIC | Age: 23
End: 2023-02-12
Attending: EMERGENCY MEDICINE
Payer: COMMERCIAL

## 2023-02-12 ENCOUNTER — HOSPITAL ENCOUNTER (EMERGENCY)
Facility: CLINIC | Age: 23
Discharge: HOME | End: 2023-02-12
Attending: EMERGENCY MEDICINE | Admitting: EMERGENCY MEDICINE
Payer: COMMERCIAL

## 2023-02-12 ENCOUNTER — TELEPHONE (OUTPATIENT)
Dept: EMERGENCY MEDICINE | Facility: CLINIC | Age: 23
End: 2023-02-12

## 2023-02-12 VITALS
WEIGHT: 144 LBS | TEMPERATURE: 98 F | BODY MASS INDEX: 22.6 KG/M2 | RESPIRATION RATE: 20 BRPM | OXYGEN SATURATION: 98 % | SYSTOLIC BLOOD PRESSURE: 100 MMHG | HEIGHT: 67 IN | DIASTOLIC BLOOD PRESSURE: 65 MMHG | HEART RATE: 104 BPM

## 2023-02-12 DIAGNOSIS — R11.2 NAUSEA AND VOMITING, UNSPECIFIED VOMITING TYPE: ICD-10-CM

## 2023-02-12 DIAGNOSIS — Z34.90 INTRAUTERINE PREGNANCY: ICD-10-CM

## 2023-02-12 DIAGNOSIS — R10.84 ABDOMINAL PAIN, GENERALIZED: ICD-10-CM

## 2023-02-12 LAB
ABO/RH(D): NORMAL
ALBUMIN SERPL BCG-MCNC: 4.7 G/DL (ref 3.5–5.2)
ALBUMIN UR-MCNC: 50 MG/DL
ALP SERPL-CCNC: 56 U/L (ref 35–104)
ALT SERPL W P-5'-P-CCNC: 10 U/L (ref 10–35)
ANION GAP SERPL CALCULATED.3IONS-SCNC: 11 MMOL/L (ref 7–15)
ANTIBODY SCREEN: NEGATIVE
APPEARANCE UR: ABNORMAL
AST SERPL W P-5'-P-CCNC: 22 U/L (ref 10–35)
BASOPHILS # BLD AUTO: 0 10E3/UL (ref 0–0.2)
BASOPHILS NFR BLD AUTO: 0 %
BILIRUB SERPL-MCNC: 0.4 MG/DL
BILIRUB UR QL STRIP: NEGATIVE
BUN SERPL-MCNC: 7.7 MG/DL (ref 6–20)
CALCIUM SERPL-MCNC: 9.4 MG/DL (ref 8.6–10)
CHLORIDE SERPL-SCNC: 102 MMOL/L (ref 98–107)
COLOR UR AUTO: YELLOW
CREAT SERPL-MCNC: 0.5 MG/DL (ref 0.51–0.95)
DEPRECATED HCO3 PLAS-SCNC: 23 MMOL/L (ref 22–29)
EOSINOPHIL # BLD AUTO: 0 10E3/UL (ref 0–0.7)
EOSINOPHIL NFR BLD AUTO: 0 %
ERYTHROCYTE [DISTWIDTH] IN BLOOD BY AUTOMATED COUNT: 11.4 % (ref 10–15)
GFR SERPL CREATININE-BSD FRML MDRD: >90 ML/MIN/1.73M2
GLUCOSE SERPL-MCNC: 99 MG/DL (ref 70–99)
GLUCOSE UR STRIP-MCNC: NEGATIVE MG/DL
HCG INTACT+B SERPL-ACNC: ABNORMAL MIU/ML
HCG SER QL IA.RAPID: POSITIVE
HCT VFR BLD AUTO: 38.2 % (ref 35–47)
HGB BLD-MCNC: 13.5 G/DL (ref 11.7–15.7)
HGB UR QL STRIP: NEGATIVE
IMM GRANULOCYTES # BLD: 0 10E3/UL
IMM GRANULOCYTES NFR BLD: 0 %
KETONES UR STRIP-MCNC: 100 MG/DL
LEUKOCYTE ESTERASE UR QL STRIP: ABNORMAL
LIPASE SERPL-CCNC: 14 U/L (ref 13–60)
LYMPHOCYTES # BLD AUTO: 0.7 10E3/UL (ref 0.8–5.3)
LYMPHOCYTES NFR BLD AUTO: 6 %
MCH RBC QN AUTO: 32.3 PG (ref 26.5–33)
MCHC RBC AUTO-ENTMCNC: 35.3 G/DL (ref 31.5–36.5)
MCV RBC AUTO: 91 FL (ref 78–100)
MONOCYTES # BLD AUTO: 0.8 10E3/UL (ref 0–1.3)
MONOCYTES NFR BLD AUTO: 7 %
MUCOUS THREADS #/AREA URNS LPF: PRESENT /LPF
NEUTROPHILS # BLD AUTO: 9.5 10E3/UL (ref 1.6–8.3)
NEUTROPHILS NFR BLD AUTO: 87 %
NITRATE UR QL: NEGATIVE
NRBC # BLD AUTO: 0 10E3/UL
NRBC BLD AUTO-RTO: 0 /100
PH UR STRIP: 6 [PH] (ref 5–7)
PLATELET # BLD AUTO: 259 10E3/UL (ref 150–450)
POTASSIUM SERPL-SCNC: 3.7 MMOL/L (ref 3.4–5.3)
PROT SERPL-MCNC: 7.6 G/DL (ref 6.4–8.3)
RBC # BLD AUTO: 4.18 10E6/UL (ref 3.8–5.2)
RBC URINE: 3 /HPF
SODIUM SERPL-SCNC: 136 MMOL/L (ref 136–145)
SP GR UR STRIP: 1.02 (ref 1–1.03)
SPECIMEN EXPIRATION DATE: NORMAL
SQUAMOUS EPITHELIAL: 10 /HPF
UROBILINOGEN UR STRIP-MCNC: 2 MG/DL
WBC # BLD AUTO: 11 10E3/UL (ref 4–11)
WBC CLUMPS #/AREA URNS HPF: PRESENT /HPF
WBC URINE: 10 /HPF

## 2023-02-12 PROCEDURE — 76801 OB US < 14 WKS SINGLE FETUS: CPT

## 2023-02-12 PROCEDURE — 96361 HYDRATE IV INFUSION ADD-ON: CPT

## 2023-02-12 PROCEDURE — 85025 COMPLETE CBC W/AUTO DIFF WBC: CPT | Performed by: EMERGENCY MEDICINE

## 2023-02-12 PROCEDURE — 84702 CHORIONIC GONADOTROPIN TEST: CPT | Performed by: EMERGENCY MEDICINE

## 2023-02-12 PROCEDURE — 81001 URINALYSIS AUTO W/SCOPE: CPT | Performed by: EMERGENCY MEDICINE

## 2023-02-12 PROCEDURE — 86901 BLOOD TYPING SEROLOGIC RH(D): CPT | Performed by: EMERGENCY MEDICINE

## 2023-02-12 PROCEDURE — 36415 COLL VENOUS BLD VENIPUNCTURE: CPT | Performed by: EMERGENCY MEDICINE

## 2023-02-12 PROCEDURE — 99285 EMERGENCY DEPT VISIT HI MDM: CPT | Mod: 25

## 2023-02-12 PROCEDURE — 86850 RBC ANTIBODY SCREEN: CPT | Performed by: EMERGENCY MEDICINE

## 2023-02-12 PROCEDURE — 80053 COMPREHEN METABOLIC PANEL: CPT | Performed by: EMERGENCY MEDICINE

## 2023-02-12 PROCEDURE — 84703 CHORIONIC GONADOTROPIN ASSAY: CPT

## 2023-02-12 PROCEDURE — 258N000003 HC RX IP 258 OP 636: Performed by: EMERGENCY MEDICINE

## 2023-02-12 PROCEDURE — 76705 ECHO EXAM OF ABDOMEN: CPT

## 2023-02-12 PROCEDURE — 83690 ASSAY OF LIPASE: CPT | Performed by: EMERGENCY MEDICINE

## 2023-02-12 PROCEDURE — 96374 THER/PROPH/DIAG INJ IV PUSH: CPT

## 2023-02-12 PROCEDURE — 250N000011 HC RX IP 250 OP 636: Performed by: EMERGENCY MEDICINE

## 2023-02-12 RX ORDER — ONDANSETRON 4 MG/1
4 TABLET, ORALLY DISINTEGRATING ORAL EVERY 6 HOURS PRN
Qty: 20 TABLET | Refills: 0 | Status: SHIPPED | OUTPATIENT
Start: 2023-02-12 | End: 2023-05-17

## 2023-02-12 RX ORDER — ONDANSETRON 2 MG/ML
4 INJECTION INTRAMUSCULAR; INTRAVENOUS EVERY 30 MIN PRN
Status: DISCONTINUED | OUTPATIENT
Start: 2023-02-12 | End: 2023-02-12 | Stop reason: HOSPADM

## 2023-02-12 RX ORDER — METOCLOPRAMIDE 10 MG/1
10 TABLET ORAL
Qty: 20 TABLET | Refills: 0 | Status: SHIPPED | OUTPATIENT
Start: 2023-02-12 | End: 2023-05-17

## 2023-02-12 RX ORDER — SODIUM CHLORIDE 9 MG/ML
INJECTION, SOLUTION INTRAVENOUS CONTINUOUS
Status: DISCONTINUED | OUTPATIENT
Start: 2023-02-12 | End: 2023-02-12 | Stop reason: HOSPADM

## 2023-02-12 RX ADMIN — SODIUM CHLORIDE 1000 ML: 9 INJECTION, SOLUTION INTRAVENOUS at 10:28

## 2023-02-12 RX ADMIN — ONDANSETRON 4 MG: 2 INJECTION INTRAMUSCULAR; INTRAVENOUS at 10:33

## 2023-02-12 ASSESSMENT — ENCOUNTER SYMPTOMS
VOMITING: 1
ABDOMINAL PAIN: 1
NAUSEA: 1

## 2023-02-12 ASSESSMENT — ACTIVITIES OF DAILY LIVING (ADL): ADLS_ACUITY_SCORE: 35

## 2023-02-12 NOTE — ED PROVIDER NOTES
"    History     Chief Complaint:  Abdominal Pain       The history is provided by the patient.      Elsi Eagle is a 22 year old female who presents with a sharp left sided and bilateral lower quadrant abdominal pain since yesterday. She rates her current pain as a 7-8/10, which is an improvement from yesterday. Nothing alleviates and exacerbates the pain. Additionally, she has had a total of 3 episodes of vomiting, and continues to have some nausea at bedside. She has been unable to keep any food or water down. She has not taken any medications for her symptoms. She is not aware of any sick contacts or recent concerning foods. She has no prior abdominal surgeries. She does not have a history of kidney stones. She does not smoke, drink alcohol, or use drugs. She denies diarrhea, hematemesis, dysuria, frequency, vaginal bleeding/discharge, chest pain, shortness of breath, cough, or rhinorrhea. She has an established primary care provider. She previously had a baby in October of 2022.     Independent Historian: None    Review of External Notes: None      ROS:  Review of Systems   Gastrointestinal: Positive for abdominal pain, nausea and vomiting.   All other systems reviewed and are negative.    Allergies:  No Known Allergies     Medications:    Benadryl   Medrol Dosepak   Flagyl     Past Medical History:    Adjustment disorder   Syncope and collapsed   STD   Asthma     Past Surgical History:    Myringotomy w/ tube placement     Social History:  Denies smoking, smokeless tobacco use, current alcohol use, or drug use   Has an established primary care provider   The patient presents to the ED alone via private vehicle     Physical Exam     Patient Vitals for the past 24 hrs:   BP Temp Temp src Pulse Resp SpO2 Height Weight   02/12/23 0957 100/65 98  F (36.7  C) Temporal 104 20 98 % 1.702 m (5' 7\") 65.3 kg (144 lb)        Physical Exam  Constitutional: Well developed, nontox appearance  Head: Atraumatic.   Neck:  " no stridor  Eyes: no scleral icterus  Cardiovascular: RRR, 2+ bilat radial pulses  Pulmonary/Chest: nml resp effort, Clear BS bilat  Abdominal: ND, soft, diffuse abdominal tenderness, no rebound or guarding   : no CVA tenderness bilat  Ext: Warm, well perfused, no edema  Neurological: A&O, symmetric facies, moves ext x4  Skin: Skin is warm and dry.   Psychiatric: Behavior is normal. Thought content normal.   Nursing note and vitals reviewed.    Emergency Department Course   Imaging:  US OB < 14 Weeks Single  Preliminary Result  IMPRESSION:   1.  Single living intrauterine gestation at 6 weeks 3 days, EDC 10/23/2023.  2.  Small subchorionic hemorrhage.    US Abdomen Limited (RUQ)  Final Result  IMPRESSION:  1.  Normal limited abdominal ultrasound.    Report per radiology    Laboratory:  Labs Ordered and Resulted from Time of ED Arrival to Time of ED Departure   COMPREHENSIVE METABOLIC PANEL - Abnormal       Result Value    Sodium 136      Potassium 3.7      Chloride 102      Carbon Dioxide (CO2) 23      Anion Gap 11      Urea Nitrogen 7.7      Creatinine 0.50 (*)     Calcium 9.4      Glucose 99      Alkaline Phosphatase 56      AST 22      ALT 10      Protein Total 7.6      Albumin 4.7      Bilirubin Total 0.4      GFR Estimate >90     CBC WITH PLATELETS AND DIFFERENTIAL - Abnormal    WBC Count 11.0      RBC Count 4.18      Hemoglobin 13.5      Hematocrit 38.2      MCV 91      MCH 32.3      MCHC 35.3      RDW 11.4      Platelet Count 259      % Neutrophils 87      % Lymphocytes 6      % Monocytes 7      % Eosinophils 0      % Basophils 0      % Immature Granulocytes 0      NRBCs per 100 WBC 0      Absolute Neutrophils 9.5 (*)     Absolute Lymphocytes 0.7 (*)     Absolute Monocytes 0.8      Absolute Eosinophils 0.0      Absolute Basophils 0.0      Absolute Immature Granulocytes 0.0      Absolute NRBCs 0.0     ISTAT HCG QUALITATIVE PREGNANCY POCT - Abnormal    HCG Qualitative POCT Positive (*)    LIPASE - Normal     Lipase 14     ROUTINE UA WITH MICROSCOPIC REFLEX TO CULTURE   HCG QUANTITATIVE PREGNANCY   TYPE AND SCREEN, ADULT    ABO/RH(D) O POS      Antibody Screen Negative      SPECIMEN EXPIRATION DATE [id number]     ABO/RH TYPE AND SCREEN      Emergency Department Course & Assessments:    Interventions:  1028 NS 1 L IV   1033 Zofran 4 mg IV     Independent Interpretation (X-rays, CTs, rhythm strip):  None     Consultations/Discussion of Management or Tests:  None     Social Determinants of Health affecting care:  Established primary care provider     Assessments:  1006 I obtained history and examined the patient as noted above.  1245 I rechecked the patient and explained findings. I discussed plan for discharge home.    Disposition:  The patient was discharged to home.     Impression & Plan    Medical Decision Makin year old female presenting w/ abdominal pain, vomiting     Social determinants affecting patient's health include:  Recent vaginal delivery in October, patient otherwise denies substance abuse     I reviewed medical records from 2/3/2023 for a telemedicine visit for vaginal discharge and the associated lab results     DDx includes gastritis, early gastroenteritis, hepatitis, pancreatitis, cholelithiasis, cholecystitis, choledocholithiasis, peptic ulcer disease, UTI, ureteral stone, pregnancy, ectopic pregnancy.  Doubt vascular catastrophe such as dissection or AAA given history, physical exam and risk factors.  Labs significant for positive pregnancy test otherwise unremarkable.  Imaging sig for IUP of approximately 6 weeks gestation, subchorionic hemorrhage.  Interventions as noted above with improvement in symptoms.  Patient's presentation may be consistent with vomiting during first trimester of pregnancy.  Doubt appendicitis, bowel obstruction, intra-abdominal abscess.  Early viral gastroenteritis remains in the differential subsequent abdominal pain secondary.  Given reassuring work-up and stable  vital signs, at this time I feel the pt is safe for discharge.  Recommendations given regarding follow up with PCP and return to the emergency department as needed for new or worsening symptoms.  Patient counseled on all results, disposition and diagnosis.  They are understanding and agreeable to plan. Patient discharged in stable condition.      Diagnosis:    ICD-10-CM    1. Nausea and vomiting, unspecified vomiting type  R11.2       2. Intrauterine pregnancy  Z34.90       3. Subchorionic hemorrhage of placenta in first trimester, single or unspecified fetus  O41.8X10     O46.8X1       4. Abdominal pain, generalized  R10.84            Discharge Medications:  Discharge Medication List as of 2/12/2023 12:52 PM      START taking these medications    Details   metoclopramide (REGLAN) 10 MG tablet Take 1 tablet (10 mg) by mouth 4 times daily (before meals and nightly), Disp-20 tablet, R-0, Local Print      ondansetron (ZOFRAN ODT) 4 MG ODT tab Take 1 tablet (4 mg) by mouth every 6 hours as needed for nausea or vomiting, Disp-20 tablet, R-0, Local Print              Scribe Disclosure:  I, Christian Boyer, am serving as a scribe at 10:22 AM on 2/12/2023 to document services personally performed by Bolivar Hall MD based on my observations and the provider's statements to me.    2/12/2023   Bolivar Hall MD Vaughn, Christopher E, MD  02/12/23 4954

## 2023-02-12 NOTE — RESULT ENCOUNTER NOTE
Left voicemail message requesting a call back to [facility] ED Lab Result RN at [phone].  RN is available every day between 9 a.m. and 5:30 p.m.

## 2023-02-12 NOTE — TELEPHONE ENCOUNTER
[facility] Emergency Department/Urgent Care Lab result notification:    Reason for call  Notify of lab results and encourage to relay result to her PCP or OB Provider    Lab result  Component      Latest Ref Rng & Units 2/12/2023   hCG Quantitative      <5 mIU/mL 94,843 (H)     Left voicemail message requesting a call back to [phone] between 9 a.m. and 5:30 p.m. for patient's ED/UC lab results.      Deonte Partida RN  [facility] Result RN  [facility] Emergency Dept Lab Result RN  # [phone]

## 2023-02-12 NOTE — ED TRIAGE NOTES
Pt presents with complaints of L side pain and bilateral lower abd pain. Pt reports vomiting yesterday as well. She reports no constipation or loose stool. Denies fevers and unsure of possibility of pregnancy.      Triage Assessment     Row Name 02/12/23 0957       Triage Assessment (Adult)    Airway WDL WDL       Respiratory WDL    Respiratory WDL WDL       Skin Circulation/Temperature WDL    Skin Circulation/Temperature WDL WDL       Cardiac WDL    Cardiac WDL WDL       Peripheral/Neurovascular WDL    Peripheral Neurovascular WDL WDL       Cognitive/Neuro/Behavioral WDL    Cognitive/Neuro/Behavioral WDL WDL

## 2023-02-13 NOTE — TELEPHONE ENCOUNTER
Elsi returned call and was notified of result  She was advised to relay the hcg quant level to her OB Provider.    Deonte Partida RN  [facility] [facility]  Emergency Dept Lab Result RN  Ph# [phone]

## 2023-03-28 ENCOUNTER — OFFICE VISIT (OUTPATIENT)
Dept: URGENT CARE | Facility: URGENT CARE | Age: 23
End: 2023-03-28
Payer: COMMERCIAL

## 2023-03-28 VITALS
RESPIRATION RATE: 20 BRPM | WEIGHT: 144 LBS | SYSTOLIC BLOOD PRESSURE: 110 MMHG | DIASTOLIC BLOOD PRESSURE: 74 MMHG | TEMPERATURE: 98.1 F | BODY MASS INDEX: 22.55 KG/M2 | OXYGEN SATURATION: 97 % | HEART RATE: 84 BPM

## 2023-03-28 DIAGNOSIS — J02.0 STREPTOCOCCAL PHARYNGITIS: Primary | ICD-10-CM

## 2023-03-28 DIAGNOSIS — R07.0 THROAT PAIN: ICD-10-CM

## 2023-03-28 LAB — DEPRECATED S PYO AG THROAT QL EIA: POSITIVE

## 2023-03-28 PROCEDURE — 87880 STREP A ASSAY W/OPTIC: CPT | Performed by: NURSE PRACTITIONER

## 2023-03-28 PROCEDURE — 99213 OFFICE O/P EST LOW 20 MIN: CPT | Performed by: NURSE PRACTITIONER

## 2023-03-28 RX ORDER — AMOXICILLIN 500 MG/1
500 CAPSULE ORAL 2 TIMES DAILY
Qty: 20 CAPSULE | Refills: 0 | Status: SHIPPED | OUTPATIENT
Start: 2023-03-28 | End: 2023-04-07

## 2023-03-28 NOTE — LETTER
March 28, 2023      Elsi Eagle  [address]  [place] [address]        To Whom It May Concern:    Elsi SHEPPARD Radhafinn  was seen on 3/28/2023.  Please excuse her  until 3/30/2023 due to illness.        Sincerely,        Yaritza Ramos, CNP

## 2023-03-28 NOTE — PROGRESS NOTES
Chief Complaint   Patient presents with     Urgent Care     Sore throat          ICD-10-CM    1. Streptococcal pharyngitis  J02.0 amoxicillin (AMOXIL) 500 MG capsule      2. Throat pain  R07.0 Streptococcus A Rapid Screen w/Reflex to PCR - Clinic Collect      Antibiotics, salt water gargles, Tylenol or ibuprofen as needed.  Recheck in 10 days if any symptoms remain.      Results for orders placed or performed in visit on 03/28/23 (from the past 24 hour(s))   Streptococcus A Rapid Screen w/Reflex to PCR - Clinic Collect    Specimen: Throat; Swab   Result Value Ref Range    Group A Strep antigen Positive (A) Negative       Subjective     Elsi Eagle is an 22 year old female who presents to clinic today for ST and runny nose for 1 day.       ROS: She denies any fever, chills, rash, cough, loss of taste or appetite, nausea or diarrhea.      Objective    There were no vitals taken for this visit.  Nurses notes and VS have been reviewed.    Physical Exam         GENERAL APPEARANCE: healthy appearing, alert     EYES: PERRL, EOMI, sclera non-icteric     HENT: tonsillar hypertrophy and tonsillar erythema     NECK: no adenopathy     SKIN: no suspicious lesions or rashes      RACQUEL Ontiveros, CNP  [facility] Provider    The use of Dragon/Additech dictation services may have been used to construct the content in this note; any grammatical or spelling errors are non-intentional. Please contact the author of this note directly if you are in need of any clarification.

## 2023-05-17 ENCOUNTER — OFFICE VISIT (OUTPATIENT)
Dept: FAMILY MEDICINE | Facility: CLINIC | Age: 23
End: 2023-05-17
Payer: COMMERCIAL

## 2023-05-17 ENCOUNTER — TELEPHONE (OUTPATIENT)
Dept: FAMILY MEDICINE | Facility: CLINIC | Age: 23
End: 2023-05-17

## 2023-05-17 DIAGNOSIS — N89.8 VAGINAL DISCHARGE: Primary | ICD-10-CM

## 2023-05-17 DIAGNOSIS — N89.8 VAGINAL DISCHARGE: ICD-10-CM

## 2023-05-17 DIAGNOSIS — Z32.01 PREGNANCY TEST POSITIVE: Primary | ICD-10-CM

## 2023-05-17 LAB — HCG UR QL: POSITIVE

## 2023-05-17 PROCEDURE — 99213 OFFICE O/P EST LOW 20 MIN: CPT

## 2023-05-17 PROCEDURE — 87591 N.GONORRHOEAE DNA AMP PROB: CPT | Performed by: NURSE PRACTITIONER

## 2023-05-17 PROCEDURE — 87491 CHLMYD TRACH DNA AMP PROBE: CPT | Performed by: NURSE PRACTITIONER

## 2023-05-17 PROCEDURE — 81025 URINE PREGNANCY TEST: CPT | Performed by: NURSE PRACTITIONER

## 2023-05-17 NOTE — PROGRESS NOTES
Assessment & Plan     Pregnancy test positive  UPT positive .     Vaginal discharge  GC/Chlanydia pending. Results should come back tomorrow. Is asymtpomatic.       6 minutes spent by me on the date of the encounter doing review of test results, patient visit and documentation        Follow up with OB    No follow-ups on file.    [facility]    Vahid Calzada is a 22 year old, presenting for the following health issues:  No chief complaint on file.         View : No data to display.              HPI     Presents for pregnancy test and STI testing. Is not having any symptoms.   LMP last month, lasted a day when it typically lasts 3 days. Took home pregnancy test yesterday, one result was negative and one had a faint positive line.   Is not on any birth control       Review of Systems   : abnormal menstrual cycles      Objective    There were no vitals taken for this visit.  There is no height or weight on file to calculate BMI.  Physical Exam   GENERAL: healthy, alert and no distress    Results for orders placed or performed in visit on 05/17/23   HCG qualitative urine     Status: Abnormal   Result Value Ref Range    hCG Urine Qualitative Positive (A) Negative

## 2023-05-18 LAB
C TRACH DNA SPEC QL PROBE+SIG AMP: NEGATIVE
N GONORRHOEA DNA SPEC QL NAA+PROBE: NEGATIVE

## 2023-05-25 ENCOUNTER — OFFICE VISIT (OUTPATIENT)
Dept: FAMILY MEDICINE | Facility: CLINIC | Age: 23
End: 2023-05-25
Payer: COMMERCIAL

## 2023-05-25 DIAGNOSIS — N39.0 URINARY TRACT INFECTION WITHOUT HEMATURIA, SITE UNSPECIFIED: Primary | ICD-10-CM

## 2023-05-25 DIAGNOSIS — R30.0 DYSURIA: ICD-10-CM

## 2023-05-25 DIAGNOSIS — N39.0 ACUTE UTI: Primary | ICD-10-CM

## 2023-05-25 LAB
ALBUMIN UR-MCNC: 30 MG/DL
APPEARANCE UR: CLEAR
BILIRUB UR QL STRIP: NEGATIVE
COLOR UR AUTO: YELLOW
GLUCOSE UR STRIP-MCNC: NEGATIVE MG/DL
HGB UR QL STRIP: ABNORMAL
KETONES UR STRIP-MCNC: NEGATIVE MG/DL
LEUKOCYTE ESTERASE UR QL STRIP: ABNORMAL
MUCOUS THREADS #/AREA URNS LPF: PRESENT /LPF
NITRATE UR QL: POSITIVE
PH UR STRIP: 6 [PH] (ref 5–7)
RBC URINE: 7 /HPF
SP GR UR STRIP: >=1.03 (ref 1–1.03)
SQUAMOUS EPITHELIAL: 2 /HPF
UROBILINOGEN UR STRIP-ACNC: 0.2 E.U./DL
WBC URINE: >182 /HPF

## 2023-05-25 PROCEDURE — 87086 URINE CULTURE/COLONY COUNT: CPT

## 2023-05-25 PROCEDURE — 87186 SC STD MICRODIL/AGAR DIL: CPT

## 2023-05-25 PROCEDURE — 99213 OFFICE O/P EST LOW 20 MIN: CPT

## 2023-05-25 PROCEDURE — 81001 URINALYSIS AUTO W/SCOPE: CPT

## 2023-05-25 RX ORDER — CEPHALEXIN 500 MG/1
500 CAPSULE ORAL 2 TIMES DAILY
Qty: 10 CAPSULE | Refills: 0 | Status: SHIPPED | OUTPATIENT
Start: 2023-05-25 | End: 2023-05-30

## 2023-05-25 RX ORDER — CEPHALEXIN 500 MG/1
500 CAPSULE ORAL 2 TIMES DAILY
Qty: 14 CAPSULE | Refills: 0 | Status: SHIPPED | OUTPATIENT
Start: 2023-05-25 | End: 2023-06-01

## 2023-05-25 NOTE — PROGRESS NOTES
Assessment & Plan     Dysuria  Urine Macro and Cx pending. Will start on Kelfex BID for 7 days.   - UA Macroscopic with reflex to Microscopic and Culture  - Urine Microscopic Exam  - Urine Culture    Acute UTI    - cephALEXin (KEFLEX) 500 MG capsule; Take 1 capsule (500 mg) by mouth 2 times daily for 7 days      10 minutes spent by me on the date of the encounter doing chart review, review of test results, patient visit and documentation        See Patient Instructions    No follow-ups on file.    [facility]    Vahid Calzada is a 22 year old, presenting for the following health issues:  No chief complaint on file.         View : No data to display.              TRAVIS Calzada presents with dysuria for past 24 hours along with frequency. No blood in the urine. Is 8 weeks pregnant.   No fever, abdominal pain. Some lower back pain. No hematuria, no vaginal discharge.        Review of Systems   Constitutional, HEENT, cardiovascular, pulmonary, gi and gu systems are negative, except as otherwise noted.      Objective    There were no vitals taken for this visit.  There is no height or weight on file to calculate BMI.  Physical Exam   GENERAL: healthy, alert and no distress    Results for orders placed or performed in visit on 05/25/23   UA Macroscopic with reflex to Microscopic and Culture     Status: Abnormal    Specimen: Urine, Midstream   Result Value Ref Range    Color Urine Yellow Colorless, Straw, Light Yellow, Yellow    Appearance Urine Clear Clear    Glucose Urine Negative Negative mg/dL    Bilirubin Urine Negative Negative    Ketones Urine Negative Negative mg/dL    Specific Gravity Urine >=1.030 1.003 - 1.035    Blood Urine Moderate (A) Negative    pH Urine 6.0 5.0 - 7.0    Protein Albumin Urine 30 (A) Negative mg/dL    Urobilinogen Urine 0.2 0.2, 1.0 E.U./dL    Nitrite Urine Positive (A) Negative    Leukocyte  Esterase Urine Moderate (A) Negative

## 2023-05-26 LAB
BACTERIA UR CULT: ABNORMAL
BACTERIA UR CULT: ABNORMAL

## 2023-06-01 ENCOUNTER — HOSPITAL ENCOUNTER (EMERGENCY)
Facility: CLINIC | Age: 23
Discharge: HOME | End: 2023-06-01
Attending: EMERGENCY MEDICINE | Admitting: EMERGENCY MEDICINE
Payer: COMMERCIAL

## 2023-06-01 ENCOUNTER — APPOINTMENT (OUTPATIENT)
Dept: ULTRASOUND IMAGING | Facility: CLINIC | Age: 23
End: 2023-06-01
Attending: EMERGENCY MEDICINE
Payer: COMMERCIAL

## 2023-06-01 VITALS
WEIGHT: 155 LBS | TEMPERATURE: 97.7 F | DIASTOLIC BLOOD PRESSURE: 87 MMHG | OXYGEN SATURATION: 100 % | HEART RATE: 82 BPM | SYSTOLIC BLOOD PRESSURE: 117 MMHG | HEIGHT: 67 IN | BODY MASS INDEX: 24.33 KG/M2 | RESPIRATION RATE: 18 BRPM

## 2023-06-01 DIAGNOSIS — O26.891 ABDOMINAL PAIN DURING PREGNANCY IN FIRST TRIMESTER: ICD-10-CM

## 2023-06-01 DIAGNOSIS — I95.1 ORTHOSTATIC SYNCOPE: ICD-10-CM

## 2023-06-01 DIAGNOSIS — R10.9 ABDOMINAL PAIN DURING PREGNANCY IN FIRST TRIMESTER: ICD-10-CM

## 2023-06-01 LAB
ABO/RH(D): NORMAL
ALBUMIN SERPL BCG-MCNC: 4.6 G/DL (ref 3.5–5.2)
ALBUMIN UR-MCNC: NEGATIVE MG/DL
ALP SERPL-CCNC: 52 U/L (ref 35–104)
ALT SERPL W P-5'-P-CCNC: 10 U/L (ref 10–35)
AMORPH CRY #/AREA URNS HPF: ABNORMAL /HPF
ANION GAP SERPL CALCULATED.3IONS-SCNC: 11 MMOL/L (ref 7–15)
ANTIBODY SCREEN: NEGATIVE
APPEARANCE UR: CLEAR
AST SERPL W P-5'-P-CCNC: 14 U/L (ref 10–35)
ATRIAL RATE - MUSE: 91 BPM
ATRIAL RATE - MUSE: 91 BPM
BASOPHILS # BLD AUTO: 0 10E3/UL (ref 0–0.2)
BASOPHILS NFR BLD AUTO: 0 %
BILIRUB SERPL-MCNC: 0.2 MG/DL
BILIRUB UR QL STRIP: NEGATIVE
BUN SERPL-MCNC: 5.4 MG/DL (ref 6–20)
CALCIUM SERPL-MCNC: 9.6 MG/DL (ref 8.6–10)
CHLORIDE SERPL-SCNC: 102 MMOL/L (ref 98–107)
COLOR UR AUTO: ABNORMAL
CREAT SERPL-MCNC: 0.58 MG/DL (ref 0.51–0.95)
DEPRECATED HCO3 PLAS-SCNC: 26 MMOL/L (ref 22–29)
DIASTOLIC BLOOD PRESSURE - MUSE: NORMAL MMHG
DIASTOLIC BLOOD PRESSURE - MUSE: NORMAL MMHG
EOSINOPHIL # BLD AUTO: 0 10E3/UL (ref 0–0.7)
EOSINOPHIL NFR BLD AUTO: 0 %
ERYTHROCYTE [DISTWIDTH] IN BLOOD BY AUTOMATED COUNT: 11 % (ref 10–15)
GFR SERPL CREATININE-BSD FRML MDRD: >90 ML/MIN/1.73M2
GLUCOSE SERPL-MCNC: 83 MG/DL (ref 70–99)
GLUCOSE UR STRIP-MCNC: NEGATIVE MG/DL
HCG INTACT+B SERPL-ACNC: ABNORMAL MIU/ML
HCT VFR BLD AUTO: 37.5 % (ref 35–47)
HGB BLD-MCNC: 12.6 G/DL (ref 11.7–15.7)
HGB UR QL STRIP: NEGATIVE
IMM GRANULOCYTES # BLD: 0 10E3/UL
IMM GRANULOCYTES NFR BLD: 0 %
INTERPRETATION ECG - MUSE: NORMAL
INTERPRETATION ECG - MUSE: NORMAL
KETONES UR STRIP-MCNC: NEGATIVE MG/DL
LEUKOCYTE ESTERASE UR QL STRIP: NEGATIVE
LYMPHOCYTES # BLD AUTO: 1.7 10E3/UL (ref 0.8–5.3)
LYMPHOCYTES NFR BLD AUTO: 25 %
MCH RBC QN AUTO: 31.9 PG (ref 26.5–33)
MCHC RBC AUTO-ENTMCNC: 33.6 G/DL (ref 31.5–36.5)
MCV RBC AUTO: 95 FL (ref 78–100)
MONOCYTES # BLD AUTO: 0.6 10E3/UL (ref 0–1.3)
MONOCYTES NFR BLD AUTO: 9 %
MUCOUS THREADS #/AREA URNS LPF: PRESENT /LPF
NEUTROPHILS # BLD AUTO: 4.4 10E3/UL (ref 1.6–8.3)
NEUTROPHILS NFR BLD AUTO: 66 %
NITRATE UR QL: NEGATIVE
NRBC # BLD AUTO: 0 10E3/UL
NRBC BLD AUTO-RTO: 0 /100
P AXIS - MUSE: 75 DEGREES
P AXIS - MUSE: 75 DEGREES
PH UR STRIP: 7.5 [PH] (ref 5–7)
PLATELET # BLD AUTO: 283 10E3/UL (ref 150–450)
POTASSIUM SERPL-SCNC: 3.4 MMOL/L (ref 3.4–5.3)
PR INTERVAL - MUSE: 144 MS
PR INTERVAL - MUSE: 144 MS
PROT SERPL-MCNC: 7.3 G/DL (ref 6.4–8.3)
QRS DURATION - MUSE: 72 MS
QRS DURATION - MUSE: 72 MS
QT - MUSE: 350 MS
QT - MUSE: 350 MS
QTC - MUSE: 430 MS
QTC - MUSE: 430 MS
R AXIS - MUSE: 92 DEGREES
R AXIS - MUSE: 92 DEGREES
RBC # BLD AUTO: 3.95 10E6/UL (ref 3.8–5.2)
RBC URINE: <1 /HPF
SODIUM SERPL-SCNC: 139 MMOL/L (ref 136–145)
SP GR UR STRIP: 1.02 (ref 1–1.03)
SPECIMEN EXPIRATION DATE: NORMAL
SQUAMOUS EPITHELIAL: 2 /HPF
SYSTOLIC BLOOD PRESSURE - MUSE: NORMAL MMHG
SYSTOLIC BLOOD PRESSURE - MUSE: NORMAL MMHG
T AXIS - MUSE: 92 DEGREES
T AXIS - MUSE: 92 DEGREES
UROBILINOGEN UR STRIP-MCNC: NORMAL MG/DL
VENTRICULAR RATE- MUSE: 91 BPM
VENTRICULAR RATE- MUSE: 91 BPM
WBC # BLD AUTO: 6.7 10E3/UL (ref 4–11)
WBC URINE: 3 /HPF

## 2023-06-01 PROCEDURE — 96361 HYDRATE IV INFUSION ADD-ON: CPT

## 2023-06-01 PROCEDURE — 76801 OB US < 14 WKS SINGLE FETUS: CPT

## 2023-06-01 PROCEDURE — 96374 THER/PROPH/DIAG INJ IV PUSH: CPT

## 2023-06-01 PROCEDURE — 84702 CHORIONIC GONADOTROPIN TEST: CPT | Performed by: EMERGENCY MEDICINE

## 2023-06-01 PROCEDURE — 99285 EMERGENCY DEPT VISIT HI MDM: CPT | Mod: 25

## 2023-06-01 PROCEDURE — 36415 COLL VENOUS BLD VENIPUNCTURE: CPT | Performed by: EMERGENCY MEDICINE

## 2023-06-01 PROCEDURE — 80053 COMPREHEN METABOLIC PANEL: CPT | Performed by: EMERGENCY MEDICINE

## 2023-06-01 PROCEDURE — 250N000011 HC RX IP 250 OP 636: Performed by: EMERGENCY MEDICINE

## 2023-06-01 PROCEDURE — 86850 RBC ANTIBODY SCREEN: CPT | Performed by: EMERGENCY MEDICINE

## 2023-06-01 PROCEDURE — 81001 URINALYSIS AUTO W/SCOPE: CPT | Performed by: EMERGENCY MEDICINE

## 2023-06-01 PROCEDURE — 86901 BLOOD TYPING SEROLOGIC RH(D): CPT | Performed by: EMERGENCY MEDICINE

## 2023-06-01 PROCEDURE — 250N000013 HC RX MED GY IP 250 OP 250 PS 637: Performed by: EMERGENCY MEDICINE

## 2023-06-01 PROCEDURE — 85025 COMPLETE CBC W/AUTO DIFF WBC: CPT | Performed by: EMERGENCY MEDICINE

## 2023-06-01 PROCEDURE — 93005 ELECTROCARDIOGRAM TRACING: CPT | Mod: 76

## 2023-06-01 PROCEDURE — 258N000003 HC RX IP 258 OP 636: Performed by: EMERGENCY MEDICINE

## 2023-06-01 PROCEDURE — 93005 ELECTROCARDIOGRAM TRACING: CPT

## 2023-06-01 RX ORDER — PYRIDOXINE HCL (VITAMIN B6) 25 MG
25 TABLET ORAL EVERY 8 HOURS PRN
Qty: 30 TABLET | Refills: 0 | Status: SHIPPED | OUTPATIENT
Start: 2023-06-01 | End: 2023-12-07

## 2023-06-01 RX ORDER — ONDANSETRON 2 MG/ML
4 INJECTION INTRAMUSCULAR; INTRAVENOUS ONCE
Status: COMPLETED | OUTPATIENT
Start: 2023-06-01 | End: 2023-06-01

## 2023-06-01 RX ORDER — ACETAMINOPHEN 325 MG/1
650 TABLET ORAL ONCE
Status: COMPLETED | OUTPATIENT
Start: 2023-06-01 | End: 2023-06-01

## 2023-06-01 RX ADMIN — ACETAMINOPHEN 650 MG: 325 TABLET ORAL at 17:13

## 2023-06-01 RX ADMIN — SODIUM CHLORIDE 1000 ML: 9 INJECTION, SOLUTION INTRAVENOUS at 15:17

## 2023-06-01 RX ADMIN — ONDANSETRON 4 MG: 2 INJECTION INTRAMUSCULAR; INTRAVENOUS at 15:19

## 2023-06-01 ASSESSMENT — ACTIVITIES OF DAILY LIVING (ADL)
ADLS_ACUITY_SCORE: 35
ADLS_ACUITY_SCORE: 33

## 2023-06-01 NOTE — ED PROVIDER NOTES
"  History     Chief Complaint:  Syncope       The history is provided by the patient.      Elsi Eagle is a 22 year old  female, who presents with syncope earlier today. Patient found out about the pregnancy 1-2 weeks ago. She is unsure how far along she is, but reports her last period was 4 weeks ago and only lasted 1 day compared to her normal 3 day periods. Patient was sitting down at work and eating, then stood up quickly and felt light headed and dizzy, then fainted. Her nearby coworker was able to catch her before falling. Patient is experiencing lower abdominal pain and nausea. Patient has experienced light headedness, dizziness, nausea, and vomiting for the past two days. Patient is not on birth control or any other medications. Patient has not had bleeding since her last period. Patient is planning on going through with this pregnancy. No swelling in the legs, no diagnosed medical problems. Has passed out before many years ago. Patient went through with an  in 2023. Patient has not yet had an ultrasound for this pregnancy.    Independent Historian:   None - Patient Only    Review of External Notes:   N/A    Medications:    The patient is currently on no regular medications.    Past Medical History:    Asthma   Depression     Past Surgical History:    Oral surgery     Physical Exam     Patient Vitals for the past 24 hrs:   BP Temp Temp src Pulse Resp SpO2 Height Weight   23 1521 -- -- -- 82 18 100 % -- --   23 1514 -- -- -- 73 13 97 % -- --   23 1501 117/87 -- -- 67 28 100 % -- --   23 1429 106/67 97.7  F (36.5  C) Temporal 78 16 99 % 1.702 m (5' 7\") 70.3 kg (155 lb)        Physical Exam  General: Alert and cooperative with exam. Patient in mild distress. Normal mentation.  Head:  Scalp is NC/AT  Eyes:  No scleral icterus, PERRL  ENT:  The external nose and ears are normal. The oropharynx is normal and without erythema; mucus membranes are moist. Uvula midline, " no evidence of deep space infection.  Neck:  Normal range of motion without rigidity.  CV:  Regular rate and rhythm    No pathologic murmur   Resp:  Breath sounds are clear bilaterally    Non-labored, no retractions or accessory muscle use  GI:  Abdomen is soft, no distension, no tenderness. No peritoneal signs  MS:  No lower extremity edema   Skin:  Warm and dry, No rash or lesions noted.  Neuro: Oriented x 3. No gross motor deficits.    Emergency Department Course   ECG  ECG results from 06/01/23   EKG 12 lead     Value    Systolic Blood Pressure     Diastolic Blood Pressure     Ventricular Rate 91    Atrial Rate 91    RI Interval 144    QRS Duration 72        QTc 430    P Axis 75    R AXIS 92    T Axis 92    Interpretation ECG      Sinus rhythm  Rightward axis  Borderline ECG  No previous ECGs available     EKG 12-lead, tracing only     Value    Systolic Blood Pressure     Diastolic Blood Pressure     Ventricular Rate 91    Atrial Rate 91    RI Interval 144    QRS Duration 72        QTc 430    P Axis 75    R AXIS 92    T Axis 92    Interpretation ECG      Sinus rhythm  Rightward axis  Borderline ECG  No previous ECGs available  Confirmed by GENERATED REPORT, COMPUTER ([id number]),  DIEGO ERICKSON ([id number]) on 6/1/2023 3:57:43 PM         Imaging:  US OB <14 Weeks w Transvaginal   Final Result   IMPRESSION:       1. Early single live IUP of 6 weeks 1 day gestation by current   ultrasound measurement, although unable to accurately measure fetal   heart rate on today's exam. Consider short-term sonographic and beta   hCG follow up.      IZABELLA VOGT MD            SYSTEM ID:  [id number]         Report per radiology    Laboratory:  Labs Ordered and Resulted from Time of ED Arrival to Time of ED Departure   HCG QUANTITATIVE PREGNANCY - Abnormal       Result Value    hCG Quantitative 45,839 (*)    ROUTINE UA WITH MICROSCOPIC REFLEX TO CULTURE - Abnormal    Color Urine Light Yellow      Appearance Urine Clear       Glucose Urine Negative      Bilirubin Urine Negative      Ketones Urine Negative      Specific Gravity Urine 1.016      Blood Urine Negative      pH Urine 7.5 (*)     Protein Albumin Urine Negative      Urobilinogen Urine Normal      Nitrite Urine Negative      Leukocyte Esterase Urine Negative      Mucus Urine Present (*)     Amorphous Crystals Urine Few (*)     RBC Urine <1      WBC Urine 3      Squamous Epithelials Urine 2 (*)    COMPREHENSIVE METABOLIC PANEL - Abnormal    Sodium 139      Potassium 3.4      Chloride 102      Carbon Dioxide (CO2) 26      Anion Gap 11      Urea Nitrogen 5.4 (*)     Creatinine 0.58      Calcium 9.6      Glucose 83      Alkaline Phosphatase 52      AST 14      ALT 10      Protein Total 7.3      Albumin 4.6      Bilirubin Total 0.2      GFR Estimate >90     CBC WITH PLATELETS AND DIFFERENTIAL    WBC Count 6.7      RBC Count 3.95      Hemoglobin 12.6      Hematocrit 37.5      MCV 95      MCH 31.9      MCHC 33.6      RDW 11.0      Platelet Count 283      % Neutrophils 66      % Lymphocytes 25      % Monocytes 9      % Eosinophils 0      % Basophils 0      % Immature Granulocytes 0      NRBCs per 100 WBC 0      Absolute Neutrophils 4.4      Absolute Lymphocytes 1.7      Absolute Monocytes 0.6      Absolute Eosinophils 0.0      Absolute Basophils 0.0      Absolute Immature Granulocytes 0.0      Absolute NRBCs 0.0     TYPE AND SCREEN, ADULT    ABO/RH(D) O POS      Antibody Screen Negative      SPECIMEN EXPIRATION DATE [id number]     ABO/RH TYPE AND SCREEN        Emergency Department Course & Assessments:       Interventions:  Medications   0.9% sodium chloride BOLUS (0 mLs Intravenous Stopped 6/1/23 1714)   ondansetron (ZOFRAN) injection 4 mg (4 mg Intravenous $Given 6/1/23 1519)   acetaminophen (TYLENOL) tablet 650 mg (650 mg Oral $Given 6/1/23 1713)        Independent Interpretation (X-rays, CTs, rhythm strip):  None    Assessments/Consultations/Discussion of Management or  Tests:  ED Course as of 06/01/23 2216   Thu Jun 01, 2023   1510 I examined the patient and obtained history as noted above.         Social Determinants of Health affecting care:   None    Disposition:  The patient was discharged to home.     Impression & Plan    Medical Decision Making:  Elsi Eagle is a 22 year old female who presents with a history and clinical exam consistent with syncope.  While orthostatic/vasovagal syncope was the most likely etiology given the history of this patient, I considered a broad differential for their syncope today including cardiac arrythmia, ACS, aortic stenosis, HOCM, PE, orthostatic hypotension, drugs, situational, carotid hypersensitivity, seizure, TIA, stroke, vasovagal, ruptured ectopic pregnancy, etc.   She has no signs of a concerning etiology for syncope at this point.  In addition,she has no family history of sudden death, no chest pain, no seizure activity or post-ictal period, no murmur, no focal neurologic symptoms, and no complaints of concerning headache.  The workup in the ED is negative and the physical exam is re-assurring.  Patient is currently pregnant.  She denies vaginal bleeding or discharge.  Quantitative hCG was obtained for future comparison and ultrasound demonstrates early single live IUP of 6 weeks 1 day gestation.  Ultrasound was unable to accurately measure fetal heart rate; this was discussed with patient as well as recommendation for close follow-up with OB/GYN for continued monitoring/care.  Recommended that she begin taking daily prenatal vitamin.  She did report nausea and was provided Zofran and IV fluids in the ED.  Later able to tolerate oral intake.  Discharged with prescription for Unisom/B6.  She did endorse some lower abdominal discomfort though there is no significant focal tenderness on exam or indication for CT/MRI imaging at this time.  Recommended continued supportive care including Tylenol for pain control.  Return precautions  were discussed.  Patient discharged home.    Diagnosis:    ICD-10-CM    1. Orthostatic syncope  I95.1       2. Abdominal pain during pregnancy in first trimester  O26.891     R10.9            Discharge Medications:  Discharge Medication List as of 6/1/2023  5:41 PM      START taking these medications    Details   doxylamine (UNISOM) 25 MG TABS tablet Take 1 tablet (25 mg) by mouth every 8 hours as needed (Nausea and vomiting; take with vitamin B6), Disp-30 tablet, R-0, E-Prescribe      pyridOXINE (VITAMIN B6) 25 MG tablet Take 1 tablet (25 mg) by mouth every 8 hours as needed (Nausea/vomiting; take with doxylamine), Disp-30 tablet, R-0, E-Prescribe            Scribe Disclosure:  I, Allison Camarena, am serving as a scribe at 3:24 PM on 6/1/2023 to document services personally performed by Bolivar Ramires DO based on my observations and the provider's statements to me.    Scribe Disclosure:  I, Bridgett Brown, am serving as a scribe at 3:30 PM on 6/1/2023 to document services personally performed by Bolivar Ramires DO based on my observations and the provider's statements to me.     6/1/2023   Bolivar Ramires, Bolivar Felix DO  06/01/23 2212

## 2023-06-01 NOTE — DISCHARGE INSTRUCTIONS
Continue daily prenatal vitamin  Tylenol as needed for pain  Vitamin B6 and Unisom as prescribed for nausea as needed

## 2023-06-01 NOTE — ED NOTES
Rapid Assessment Note    History:   Elsi Eagle is a 22 year old female who presents with syncope. Patient reports she was been vomiting for the past two days, intermittent sharp abdominal pain, and bilateral flank pain. She endorses fainting today. She reports she is pregnant and that she found out a few weeks ago. She reports feeling hungry today at work and eating, then fainting. She reports no head injury. She states that this is her second pregnancy. She reports she works at [employer]. She reports her last period last month.     Exam:   General:  Alert, interactive  Cardiovascular:  Well perfused  Lungs:  No respiratory distress, no accessory muscle use  Neuro:  Moving all 4 extremities  Skin:  Warm, dry  Psych:  Normal affect  GI: Bilateral lower quadrant tenderness    Plan of Care:   I evaluated the patient and developed an initial plan of care. I discussed this plan and explained that I, or one of my partners, would be returning to complete the evaluation.     I, Dayana Merida, am serving as a scribe to document services personally performed by Adalid Li MD, based on my observations and the provider's statements to me.    6/1/2023  [facility] [facility]    Portions of this medical record were completed by a scribe. UPON MY REVIEW AND AUTHENTICATION BY ELECTRONIC SIGNATURE, this confirms (a) I performed the applicable clinical services, and (b) the record is accurate.      Adalid Li MD  06/01/23 4923

## 2023-06-01 NOTE — ED TRIAGE NOTES
Pt is pregnant and has pain in her side  Pt had a syncope today  Pt denies hitting her head     Pt has constant abd pain

## 2023-07-02 ENCOUNTER — HOSPITAL ENCOUNTER (EMERGENCY)
Facility: CLINIC | Age: 23
Discharge: HOME | End: 2023-07-03
Attending: EMERGENCY MEDICINE | Admitting: EMERGENCY MEDICINE
Payer: COMMERCIAL

## 2023-07-02 DIAGNOSIS — R10.12 LEFT UPPER QUADRANT ABDOMINAL PAIN: ICD-10-CM
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
## 2023-07-02 PROCEDURE — 99285 EMERGENCY DEPT VISIT HI MDM: CPT | Mod: 25 | Performed by: EMERGENCY MEDICINE

## 2023-07-02 PROCEDURE — 99283 EMERGENCY DEPT VISIT LOW MDM: CPT | Performed by: EMERGENCY MEDICINE

## 2023-07-02 NOTE — LETTER
July 3, 2023      To Whom It May Concern:      Elsi Eagle was seen in our Emergency Department today, 07/03/23.  I expect her condition to improve over the next 1-2 days.  She may return to work when improved.    Sincerely,        Mary Erwin MD

## 2023-07-03 ENCOUNTER — APPOINTMENT (OUTPATIENT)
Dept: CT IMAGING | Facility: CLINIC | Age: 23
End: 2023-07-03
Attending: EMERGENCY MEDICINE
Payer: COMMERCIAL

## 2023-07-03 VITALS
TEMPERATURE: 99.1 F | RESPIRATION RATE: 18 BRPM | BODY MASS INDEX: 21.31 KG/M2 | HEIGHT: 67 IN | DIASTOLIC BLOOD PRESSURE: 66 MMHG | WEIGHT: 135.8 LBS | HEART RATE: 59 BPM | OXYGEN SATURATION: 99 % | SYSTOLIC BLOOD PRESSURE: 115 MMHG

## 2023-07-03 LAB
ANION GAP SERPL CALCULATED.3IONS-SCNC: 14 MMOL/L (ref 7–15)
BUN SERPL-MCNC: 5.8 MG/DL (ref 6–20)
CALCIUM SERPL-MCNC: 9 MG/DL (ref 8.6–10)
CHLORIDE SERPL-SCNC: 103 MMOL/L (ref 98–107)
CREAT SERPL-MCNC: 0.58 MG/DL (ref 0.51–0.95)
DEPRECATED HCO3 PLAS-SCNC: 23 MMOL/L (ref 22–29)
GFR SERPL CREATININE-BSD FRML MDRD: >90 ML/MIN/1.73M2
GLUCOSE SERPL-MCNC: 106 MG/DL (ref 70–99)
HCG INTACT+B SERPL-ACNC: 1131 MIU/ML
HCG UR QL: POSITIVE
INTERNAL QC OK POCT: ABNORMAL
POCT KIT EXPIRATION DATE: ABNORMAL
POCT KIT LOT NUMBER: ABNORMAL
POTASSIUM SERPL-SCNC: 3.3 MMOL/L (ref 3.4–5.3)
SODIUM SERPL-SCNC: 140 MMOL/L (ref 136–145)

## 2023-07-03 PROCEDURE — 250N000011 HC RX IP 250 OP 636: Mod: JZ | Performed by: EMERGENCY MEDICINE

## 2023-07-03 PROCEDURE — 81025 URINE PREGNANCY TEST: CPT | Performed by: EMERGENCY MEDICINE

## 2023-07-03 PROCEDURE — 84702 CHORIONIC GONADOTROPIN TEST: CPT | Performed by: EMERGENCY MEDICINE

## 2023-07-03 PROCEDURE — 74177 CT ABD & PELVIS W/CONTRAST: CPT

## 2023-07-03 PROCEDURE — 36415 COLL VENOUS BLD VENIPUNCTURE: CPT | Performed by: EMERGENCY MEDICINE

## 2023-07-03 PROCEDURE — 80048 BASIC METABOLIC PNL TOTAL CA: CPT | Performed by: EMERGENCY MEDICINE

## 2023-07-03 RX ORDER — IOPAMIDOL 755 MG/ML
100 INJECTION, SOLUTION INTRAVASCULAR ONCE
Status: COMPLETED | OUTPATIENT
Start: 2023-07-03 | End: 2023-07-03

## 2023-07-03 RX ADMIN — IOPAMIDOL 66 ML: 755 INJECTION, SOLUTION INTRAVENOUS at 03:02

## 2023-07-03 ASSESSMENT — ACTIVITIES OF DAILY LIVING (ADL)
ADLS_ACUITY_SCORE: 33
ADLS_ACUITY_SCORE: 37
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
## 2023-07-03 NOTE — DISCHARGE INSTRUCTIONS
You can use tylenol or ibuprofen as needed for pain. Follow up with your outpatient provider within the next few days. Return with any worsening or concerns.
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
## 2023-07-06 ENCOUNTER — HOSPITAL ENCOUNTER (EMERGENCY)
Facility: CLINIC | Age: 23
Discharge: HOME | End: 2023-07-06
Attending: EMERGENCY MEDICINE | Admitting: EMERGENCY MEDICINE
Payer: COMMERCIAL

## 2023-07-06 ENCOUNTER — APPOINTMENT (OUTPATIENT)
Dept: CT IMAGING | Facility: CLINIC | Age: 23
End: 2023-07-06
Attending: EMERGENCY MEDICINE
Payer: COMMERCIAL

## 2023-07-06 ENCOUNTER — APPOINTMENT (OUTPATIENT)
Dept: ULTRASOUND IMAGING | Facility: CLINIC | Age: 23
End: 2023-07-06
Attending: EMERGENCY MEDICINE
Payer: COMMERCIAL

## 2023-07-06 VITALS
DIASTOLIC BLOOD PRESSURE: 73 MMHG | TEMPERATURE: 98.2 F | BODY MASS INDEX: 25.49 KG/M2 | SYSTOLIC BLOOD PRESSURE: 110 MMHG | HEART RATE: 52 BPM | RESPIRATION RATE: 18 BRPM | HEIGHT: 61 IN | OXYGEN SATURATION: 99 % | WEIGHT: 135 LBS

## 2023-07-06 DIAGNOSIS — R10.84 GENERALIZED ABDOMINAL PAIN: ICD-10-CM

## 2023-07-06 LAB
ALBUMIN SERPL BCG-MCNC: 4.2 G/DL (ref 3.5–5.2)
ALBUMIN UR-MCNC: 10 MG/DL
ALP SERPL-CCNC: 55 U/L (ref 35–104)
ALT SERPL W P-5'-P-CCNC: 14 U/L (ref 0–50)
ANION GAP SERPL CALCULATED.3IONS-SCNC: 13 MMOL/L (ref 7–15)
APPEARANCE UR: CLEAR
AST SERPL W P-5'-P-CCNC: 17 U/L (ref 0–45)
BASOPHILS # BLD AUTO: 0 10E3/UL (ref 0–0.2)
BASOPHILS NFR BLD AUTO: 0 %
BILIRUB SERPL-MCNC: 0.3 MG/DL
BILIRUB UR QL STRIP: NEGATIVE
BUN SERPL-MCNC: 5.1 MG/DL (ref 6–20)
CALCIUM SERPL-MCNC: 8.6 MG/DL (ref 8.6–10)
CHLORIDE SERPL-SCNC: 107 MMOL/L (ref 98–107)
COLOR UR AUTO: ABNORMAL
CREAT SERPL-MCNC: 0.64 MG/DL (ref 0.51–0.95)
DEPRECATED HCO3 PLAS-SCNC: 24 MMOL/L (ref 22–29)
EOSINOPHIL # BLD AUTO: 0 10E3/UL (ref 0–0.7)
EOSINOPHIL NFR BLD AUTO: 0 %
ERYTHROCYTE [DISTWIDTH] IN BLOOD BY AUTOMATED COUNT: 12 % (ref 10–15)
GFR SERPL CREATININE-BSD FRML MDRD: >90 ML/MIN/1.73M2
GLUCOSE BLDC GLUCOMTR-MCNC: 101 MG/DL (ref 70–99)
GLUCOSE SERPL-MCNC: 102 MG/DL (ref 70–99)
GLUCOSE UR STRIP-MCNC: NEGATIVE MG/DL
HCG INTACT+B SERPL-ACNC: 523 MIU/ML
HCT VFR BLD AUTO: 35.9 % (ref 35–47)
HGB BLD-MCNC: 12.2 G/DL (ref 11.7–15.7)
HGB UR QL STRIP: ABNORMAL
IMM GRANULOCYTES # BLD: 0 10E3/UL
IMM GRANULOCYTES NFR BLD: 0 %
KETONES UR STRIP-MCNC: NEGATIVE MG/DL
LEUKOCYTE ESTERASE UR QL STRIP: ABNORMAL
LYMPHOCYTES # BLD AUTO: 2.8 10E3/UL (ref 0.8–5.3)
LYMPHOCYTES NFR BLD AUTO: 37 %
MCH RBC QN AUTO: 32.2 PG (ref 26.5–33)
MCHC RBC AUTO-ENTMCNC: 34 G/DL (ref 31.5–36.5)
MCV RBC AUTO: 95 FL (ref 78–100)
MONOCYTES # BLD AUTO: 0.7 10E3/UL (ref 0–1.3)
MONOCYTES NFR BLD AUTO: 9 %
MUCOUS THREADS #/AREA URNS LPF: PRESENT /LPF
NEUTROPHILS # BLD AUTO: 4 10E3/UL (ref 1.6–8.3)
NEUTROPHILS NFR BLD AUTO: 54 %
NITRATE UR QL: NEGATIVE
NRBC # BLD AUTO: 0 10E3/UL
NRBC BLD AUTO-RTO: 0 /100
PH UR STRIP: 6 [PH] (ref 5–7)
PLATELET # BLD AUTO: 347 10E3/UL (ref 150–450)
POTASSIUM SERPL-SCNC: 3.3 MMOL/L (ref 3.4–5.3)
PROT SERPL-MCNC: 6.6 G/DL (ref 6.4–8.3)
RBC # BLD AUTO: 3.79 10E6/UL (ref 3.8–5.2)
RBC URINE: 5 /HPF
SODIUM SERPL-SCNC: 144 MMOL/L (ref 136–145)
SP GR UR STRIP: 1.01 (ref 1–1.03)
SQUAMOUS EPITHELIAL: 1 /HPF
UROBILINOGEN UR STRIP-MCNC: NORMAL MG/DL
WBC # BLD AUTO: 7.5 10E3/UL (ref 4–11)
WBC URINE: 9 /HPF

## 2023-07-06 PROCEDURE — 250N000011 HC RX IP 250 OP 636: Mod: JZ | Performed by: EMERGENCY MEDICINE

## 2023-07-06 PROCEDURE — 85014 HEMATOCRIT: CPT | Performed by: EMERGENCY MEDICINE

## 2023-07-06 PROCEDURE — 80053 COMPREHEN METABOLIC PANEL: CPT | Performed by: EMERGENCY MEDICINE

## 2023-07-06 PROCEDURE — 84702 CHORIONIC GONADOTROPIN TEST: CPT | Performed by: EMERGENCY MEDICINE

## 2023-07-06 PROCEDURE — 96374 THER/PROPH/DIAG INJ IV PUSH: CPT | Mod: 59 | Performed by: EMERGENCY MEDICINE

## 2023-07-06 PROCEDURE — 74177 CT ABD & PELVIS W/CONTRAST: CPT

## 2023-07-06 PROCEDURE — 36415 COLL VENOUS BLD VENIPUNCTURE: CPT | Performed by: EMERGENCY MEDICINE

## 2023-07-06 PROCEDURE — 87086 URINE CULTURE/COLONY COUNT: CPT | Performed by: EMERGENCY MEDICINE

## 2023-07-06 PROCEDURE — 99285 EMERGENCY DEPT VISIT HI MDM: CPT | Mod: 25 | Performed by: EMERGENCY MEDICINE

## 2023-07-06 PROCEDURE — 82962 GLUCOSE BLOOD TEST: CPT

## 2023-07-06 PROCEDURE — 81001 URINALYSIS AUTO W/SCOPE: CPT | Performed by: EMERGENCY MEDICINE

## 2023-07-06 PROCEDURE — 76705 ECHO EXAM OF ABDOMEN: CPT | Mod: 26 | Performed by: EMERGENCY MEDICINE

## 2023-07-06 PROCEDURE — 250N000009 HC RX 250: Performed by: EMERGENCY MEDICINE

## 2023-07-06 PROCEDURE — 76705 ECHO EXAM OF ABDOMEN: CPT | Performed by: EMERGENCY MEDICINE

## 2023-07-06 PROCEDURE — 76830 TRANSVAGINAL US NON-OB: CPT

## 2023-07-06 RX ORDER — MORPHINE SULFATE 4 MG/ML
4 INJECTION, SOLUTION INTRAMUSCULAR; INTRAVENOUS ONCE
Status: COMPLETED | OUTPATIENT
Start: 2023-07-06 | End: 2023-07-06

## 2023-07-06 RX ORDER — CYCLOBENZAPRINE HCL 10 MG
10 TABLET ORAL 3 TIMES DAILY PRN
Qty: 20 TABLET | Refills: 0 | Status: SHIPPED | OUTPATIENT
Start: 2023-07-06 | End: 2023-07-12

## 2023-07-06 RX ORDER — DICLOFENAC EPOLAMINE 0.01 G/1
1 SYSTEM TOPICAL 2 TIMES DAILY
Qty: 30 PATCH | Refills: 0 | Status: SHIPPED | OUTPATIENT
Start: 2023-07-06 | End: 2023-07-21

## 2023-07-06 RX ORDER — IOPAMIDOL 755 MG/ML
100 INJECTION, SOLUTION INTRAVASCULAR ONCE
Status: COMPLETED | OUTPATIENT
Start: 2023-07-06 | End: 2023-07-06

## 2023-07-06 RX ADMIN — SODIUM CHLORIDE 56 ML: 9 INJECTION, SOLUTION INTRAVENOUS at 05:06

## 2023-07-06 RX ADMIN — MORPHINE SULFATE 4 MG: 4 INJECTION INTRAVENOUS at 04:51

## 2023-07-06 RX ADMIN — IOPAMIDOL 66 ML: 755 INJECTION, SOLUTION INTRAVENOUS at 05:06

## 2023-07-06 ASSESSMENT — ACTIVITIES OF DAILY LIVING (ADL)
ADLS_ACUITY_SCORE: 35
ADLS_ACUITY_SCORE: 35

## 2023-07-06 NOTE — DISCHARGE INSTRUCTIONS
Please take 1000 mg of Tylenol and 600 mg of ibuprofen every 6 hours for pain control.     Can take diclofenac patch as well.    Watch for signs of infection following the D&C this would include fever, body aches, chills.  Also watch for signs of heavy vaginal bleeding did not include soaking to an hour for 2 hours or more worsening fainting or near fainting.
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
## 2023-07-06 NOTE — ED NOTES
Pt reported that arms and legs felt tingely.  Pt also reports that throat feels tight and has since came to the hospital, MD notified.
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
## 2023-07-07 LAB — BACTERIA UR CULT: NO GROWTH

## 2023-07-13 ENCOUNTER — OFFICE VISIT (OUTPATIENT)
Dept: FAMILY MEDICINE | Facility: CLINIC | Age: 23
End: 2023-07-13
Payer: COMMERCIAL

## 2023-07-13 VITALS
WEIGHT: 134 LBS | HEIGHT: 60 IN | SYSTOLIC BLOOD PRESSURE: 102 MMHG | TEMPERATURE: 97.9 F | BODY MASS INDEX: 26.31 KG/M2 | RESPIRATION RATE: 17 BRPM | OXYGEN SATURATION: 97 % | DIASTOLIC BLOOD PRESSURE: 60 MMHG | HEART RATE: 86 BPM

## 2023-07-13 DIAGNOSIS — G44.219 EPISODIC TENSION-TYPE HEADACHE, NOT INTRACTABLE: ICD-10-CM
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
## 2023-07-13 PROBLEM — J45.909 ASTHMA: Status: ACTIVE | Noted: 2022-04-04

## 2023-07-13 PROCEDURE — 99213 OFFICE O/P EST LOW 20 MIN: CPT | Performed by: PHYSICIAN ASSISTANT

## 2023-07-13 RX ORDER — METHOCARBAMOL 500 MG/1
500 TABLET, FILM COATED ORAL 3 TIMES DAILY
Qty: 90 TABLET | Refills: 1 | Status: SHIPPED | OUTPATIENT
Start: 2023-07-13 | End: 2023-12-07

## 2023-07-13 ASSESSMENT — PAIN SCALES - GENERAL: PAINLEVEL: EXTREME PAIN (8)

## 2023-07-17 ENCOUNTER — HOSPITAL ENCOUNTER (OUTPATIENT)
Dept: CT IMAGING | Facility: CLINIC | Age: 23
Discharge: HOME | End: 2023-07-17
Attending: PHYSICIAN ASSISTANT | Admitting: PHYSICIAN ASSISTANT
Payer: COMMERCIAL

## 2023-07-17 PROCEDURE — 70450 CT HEAD/BRAIN W/O DYE: CPT

## 2023-07-26 ENCOUNTER — E-VISIT (OUTPATIENT)
Dept: FAMILY MEDICINE | Facility: CLINIC | Age: 23
End: 2023-07-26
Payer: COMMERCIAL

## 2023-07-26 ENCOUNTER — E-VISIT (OUTPATIENT)
Dept: URGENT CARE | Facility: CLINIC | Age: 23
End: 2023-07-26

## 2023-07-26 DIAGNOSIS — R39.9 URINARY SYMPTOM OR SIGN: Primary | ICD-10-CM

## 2023-07-26 DIAGNOSIS — R68.89: Primary | ICD-10-CM

## 2023-07-26 PROCEDURE — 99207 PR NON-BILLABLE SERV PER CHARTING: CPT | Performed by: PHYSICIAN ASSISTANT

## 2023-07-26 PROCEDURE — 99207 PR NON-BILLABLE SERV PER CHARTING: CPT | Performed by: NURSE PRACTITIONER

## 2023-07-26 NOTE — PATIENT INSTRUCTIONS
Baldemar Calzada,  You did not fill out any symptoms  If you still need something please fill out a new survey with symptomd  Thanks  Melissa Quintana

## 2023-07-28 ENCOUNTER — LAB (OUTPATIENT)
Dept: LAB | Facility: CLINIC | Age: 23
End: 2023-07-28
Payer: COMMERCIAL

## 2023-07-28 DIAGNOSIS — R39.9 URINARY SYMPTOM OR SIGN: ICD-10-CM

## 2023-07-28 LAB
ALBUMIN UR-MCNC: NEGATIVE MG/DL
APPEARANCE UR: CLEAR
BILIRUB UR QL STRIP: NEGATIVE
CLUE CELLS: PRESENT
COLOR UR AUTO: YELLOW
GLUCOSE UR STRIP-MCNC: NEGATIVE MG/DL
HCG UR QL: NEGATIVE
HGB UR QL STRIP: NEGATIVE
KETONES UR STRIP-MCNC: NEGATIVE MG/DL
LEUKOCYTE ESTERASE UR QL STRIP: NEGATIVE
NITRATE UR QL: NEGATIVE
PH UR STRIP: 6.5 [PH] (ref 5–7)
SP GR UR STRIP: 1.02 (ref 1–1.03)
TRICHOMONAS, WET PREP: ABNORMAL
UROBILINOGEN UR STRIP-ACNC: 0.2 E.U./DL
WBC'S/HIGH POWER FIELD, WET PREP: ABNORMAL
YEAST, WET PREP: ABNORMAL

## 2023-07-28 PROCEDURE — 81003 URINALYSIS AUTO W/O SCOPE: CPT

## 2023-07-28 PROCEDURE — 81025 URINE PREGNANCY TEST: CPT

## 2023-07-28 PROCEDURE — 87210 SMEAR WET MOUNT SALINE/INK: CPT

## 2023-07-28 RX ORDER — METRONIDAZOLE 500 MG/1
500 TABLET ORAL 2 TIMES DAILY
Qty: 14 TABLET | Refills: 0 | Status: SHIPPED | OUTPATIENT
Start: 2023-07-28 | End: 2023-08-04

## 2023-07-28 NOTE — RESULT ENCOUNTER NOTE
"Levy Calzada  Your attached urine is within normal limits and your pregnancy test is negative.  You do have signs of BACTERIAL VAGINOSIS though.  This isn't specifically a sexually transmitted infection, but can just happen due to overgrowth of certain bacteria in your vagina.  If you feel like you are having symptoms that bother you such as increased vaginal discharge, a different odor, or vaginal pain or discomfort, than I recommend treatment.   I will send a prescription to your pharmacy that you can  if you would like to treat this.  Do not take this medication with alcohol as it will cause a very unpleasant reaction.    Please contact the office with any questions or concerns.    Dayna Haynes \"Leonardo\" Leann PAFUAD    "

## 2023-08-16 ENCOUNTER — OFFICE VISIT (OUTPATIENT)
Dept: FAMILY MEDICINE | Facility: CLINIC | Age: 23
End: 2023-08-16
Payer: COMMERCIAL

## 2023-08-16 VITALS
OXYGEN SATURATION: 98 % | HEART RATE: 71 BPM | DIASTOLIC BLOOD PRESSURE: 56 MMHG | SYSTOLIC BLOOD PRESSURE: 96 MMHG | TEMPERATURE: 97.4 F

## 2023-08-16 DIAGNOSIS — J02.0 STREP THROAT: ICD-10-CM

## 2023-08-16 DIAGNOSIS — J02.9 ACUTE SORE THROAT: Primary | ICD-10-CM

## 2023-08-16 LAB — DEPRECATED S PYO AG THROAT QL EIA: POSITIVE

## 2023-08-16 PROCEDURE — 87880 STREP A ASSAY W/OPTIC: CPT

## 2023-08-16 PROCEDURE — 99213 OFFICE O/P EST LOW 20 MIN: CPT

## 2023-08-16 RX ORDER — AMOXICILLIN 500 MG/1
500 CAPSULE ORAL 2 TIMES DAILY
Qty: 20 CAPSULE | Refills: 0 | Status: SHIPPED | OUTPATIENT
Start: 2023-08-16 | End: 2023-08-26

## 2023-08-16 NOTE — LETTER
August 16, 2023      Elsi Eagle  [address]   [place] [address]        To Whom It May Concern:    Elsi Eagle  was seen on 8/26/23.  Please excuse her from work missed today 8/16/23 due to illness. She can return to work 8/17/23 as long as she is fever free.         Sincerely,        [facility]

## 2023-08-16 NOTE — PROGRESS NOTES
Assessment & Plan     Acute sore throat    - Streptococcus A Rapid Scr w Reflx to PCR    Strep throat    - amoxicillin (AMOXIL) 500 MG capsule; Take 1 capsule (500 mg) by mouth 2 times daily for 10 days      10 minutes spent by me on the date of the encounter doing review of test results, patient visit, and documentation        See Patient Instructions    No follow-ups on file.    [facility]    Vahid Calzada is a 22 year old, presenting for the following health issues:  Urgent Care and Pharyngitis (Sore throat and ear pain for few days. Pressure both ears.)      HPI     Presents with sore throat and bilateral ear pain for the past 2 to 3 days.  Not using anything currently for symptoms.  No known ill contacts although her 10-month-old infant son has had a very runny nose.  No fevers, headache or GI upset      Review of Systems   Constitutional, HEENT, cardiovascular, pulmonary, gi and gu systems are negative, except as otherwise noted.      Objective    LMP  (LMP Unknown)   There is no height or weight on file to calculate BMI.  Physical Exam   GENERAL: healthy, alert and no distress  EYES: Eyes grossly normal to inspection, PERRL and conjunctivae and sclerae normal  HENT: normal cephalic/atraumatic, ear canals and TM's normal, nose and mouth without ulcers or lesions, oropharynx clear, oral mucous membranes moist, tonsillar hypertrophy, tonsillar erythema, no exudates  NECK: bilateral anterior cervical adenopathy  RESP: lungs clear to auscultation - no rales, rhonchi or wheezes    Results for orders placed or performed in visit on 08/16/23   Streptococcus A Rapid Scr w Reflx to PCR     Status: Abnormal    Specimen: Throat; Swab   Result Value Ref Range    Group A Strep antigen Positive (A) Negative

## 2023-08-28 ENCOUNTER — E-VISIT (OUTPATIENT)
Dept: FAMILY MEDICINE | Facility: CLINIC | Age: 23
End: 2023-08-28
Payer: COMMERCIAL

## 2023-08-28 DIAGNOSIS — B37.31 CANDIDAL VULVOVAGINITIS: Primary | ICD-10-CM

## 2023-08-28 PROCEDURE — 99207 PR NON-BILLABLE SERV PER CHARTING: CPT | Performed by: PHYSICIAN ASSISTANT

## 2023-08-29 RX ORDER — FLUCONAZOLE 150 MG/1
150 TABLET ORAL ONCE
Qty: 1 TABLET | Refills: 0 | Status: SHIPPED | OUTPATIENT
Start: 2023-08-29 | End: 2023-08-29

## 2023-10-04 ENCOUNTER — LAB (OUTPATIENT)
Dept: LAB | Facility: CLINIC | Age: 23
End: 2023-10-04
Payer: COMMERCIAL

## 2023-10-04 ENCOUNTER — OFFICE VISIT (OUTPATIENT)
Dept: FAMILY MEDICINE | Facility: CLINIC | Age: 23
End: 2023-10-04
Payer: COMMERCIAL

## 2023-10-04 VITALS
DIASTOLIC BLOOD PRESSURE: 74 MMHG | BODY MASS INDEX: 20.2 KG/M2 | OXYGEN SATURATION: 100 % | RESPIRATION RATE: 14 BRPM | SYSTOLIC BLOOD PRESSURE: 115 MMHG | WEIGHT: 133.3 LBS | HEART RATE: 62 BPM | TEMPERATURE: 97.4 F | HEIGHT: 68 IN

## 2023-10-04 DIAGNOSIS — Z32.00 PREGNANCY EXAMINATION OR TEST, PREGNANCY UNCONFIRMED: Primary | ICD-10-CM

## 2023-10-04 DIAGNOSIS — Z30.8 ENCOUNTER FOR OTHER CONTRACEPTIVE MANAGEMENT: ICD-10-CM

## 2023-10-04 DIAGNOSIS — Z32.00 PREGNANCY EXAMINATION OR TEST, PREGNANCY UNCONFIRMED: ICD-10-CM

## 2023-10-04 LAB — HCG INTACT+B SERPL-ACNC: <1 MIU/ML

## 2023-10-04 PROCEDURE — 36415 COLL VENOUS BLD VENIPUNCTURE: CPT

## 2023-10-04 PROCEDURE — 99213 OFFICE O/P EST LOW 20 MIN: CPT | Performed by: FAMILY MEDICINE

## 2023-10-04 PROCEDURE — 84702 CHORIONIC GONADOTROPIN TEST: CPT

## 2023-10-04 RX ORDER — NORGESTIMATE AND ETHINYL ESTRADIOL 0.25-0.035
1 KIT ORAL DAILY
Qty: 112 TABLET | Refills: 4 | Status: SHIPPED | OUTPATIENT
Start: 2023-10-04 | End: 2023-12-07

## 2023-10-04 ASSESSMENT — ASTHMA QUESTIONNAIRES: ACT_TOTALSCORE: 25

## 2023-10-04 NOTE — PROGRESS NOTES
Assessment & Plan     Problem List Items Addressed This Visit    None  Visit Diagnoses       Pregnancy examination or test, pregnancy unconfirmed    -  Primary    Relevant Orders    REVIEW OF HEALTH MAINTENANCE PROTOCOL ORDERS (Completed)    HCG quantitative pregnancy    Encounter for other contraceptive management        Relevant Medications    norgestimate-ethinyl estradiol (ORTHO-CYCLEN) 0.25-35 MG-MCG tablet          Hcg pregnancy test ordered to confirm pregnancy. Discussed birthcontrol options and safe sex practices. Prescribed Sprintec birthcontrol and discussed with patient how/when to take if pregnancy results are negative.    Jerome Vitale, DNP student      Subjective   Elsi is a 23 year old, presenting for the following health issues:  Pregnancy Test        10/4/2023    12:59 PM   Additional Questions   Roomed by mayela   Accompanied by self     Elsi is a 23 year old with no past medical history presenting to clinic for concern for pregnancy. At home urine tests negative. Symptoms include headaches, mouth tingling/metallic taste, nausea, fatigue, pelvic discomfort/stretching, breast tenderness. She reports these symptoms are similar to her previous pregnancy. Reports that she is not currently on birthcontrol or using protection during intercourse. LMP August 2023. Vaginal discharge is clear. Denies vaginal itching, bleeding, or odor. Desires pregnancy blood test for confirmation.     Reports if not pregnant she wishes to start a new form of birthcontrol. Last birthcontrol (NuvaRing) made her bleed to often. Denies heavy bleeding with periods. Denies history of smoking, blood clots, or breast/cervical cancers.     History of Present Illness       Reason for visit:  Preg concerns  Symptom onset:  1-2 weeks ago  Symptoms include:  Teeth pain, over hunger, missed period,headaches,discharge,  Symptom intensity:  Moderate  Symptom progression:  Staying the same  Had these symptoms before:  Yes  Has  "tried/received treatment for these symptoms:  No  What makes it worse:  Obly had them when i was preg,the mouth tingling normlly is abgive away with the headaches but the urine test is showing neg    She eats 2-3 servings of fruits and vegetables daily.She consumes 3 sweetened beverage(s) daily.She exercises with enough effort to increase her heart rate 9 or less minutes per day.  She exercises with enough effort to increase her heart rate 3 or less days per week.   She is taking medications regularly.       Review of Systems   CONSTITUTIONAL: NEGATIVE for fever, chills, change in weight  BREAST: POSITIVE for breast tenderness  : negative for vaginal discharge, odor, and bleeding      Objective    /74   Pulse 62   Temp 97.4  F (36.3  C) (Oral)   Resp 14   Ht 1.715 m (5' 7.52\")   Wt 60.5 kg (133 lb 4.8 oz)   LMP 08/19/2023 (Exact Date)   SpO2 100%   BMI 20.56 kg/m    Body mass index is 20.56 kg/m .  Physical Exam   GENERAL: healthy, alert and no distress  PSYCH: mentation appears normal, affect normal/bright    No results found for this or any previous visit (from the past 24 hour(s)).  "

## 2023-10-08 ENCOUNTER — HOSPITAL ENCOUNTER (EMERGENCY)
Facility: CLINIC | Age: 23
Discharge: HOME | End: 2023-10-08
Attending: EMERGENCY MEDICINE | Admitting: EMERGENCY MEDICINE
Payer: COMMERCIAL

## 2023-10-08 VITALS
RESPIRATION RATE: 16 BRPM | DIASTOLIC BLOOD PRESSURE: 73 MMHG | HEART RATE: 104 BPM | TEMPERATURE: 98.5 F | BODY MASS INDEX: 19.89 KG/M2 | SYSTOLIC BLOOD PRESSURE: 111 MMHG | OXYGEN SATURATION: 96 % | WEIGHT: 129 LBS

## 2023-10-08 DIAGNOSIS — R21 RASH: ICD-10-CM

## 2023-10-08 PROCEDURE — 96372 THER/PROPH/DIAG INJ SC/IM: CPT | Performed by: PHYSICIAN ASSISTANT

## 2023-10-08 PROCEDURE — 99283 EMERGENCY DEPT VISIT LOW MDM: CPT | Mod: FS | Performed by: EMERGENCY MEDICINE

## 2023-10-08 PROCEDURE — 250N000011 HC RX IP 250 OP 636: Mod: JZ | Performed by: PHYSICIAN ASSISTANT

## 2023-10-08 PROCEDURE — 250N000013 HC RX MED GY IP 250 OP 250 PS 637: Performed by: PHYSICIAN ASSISTANT

## 2023-10-08 PROCEDURE — 99284 EMERGENCY DEPT VISIT MOD MDM: CPT | Performed by: EMERGENCY MEDICINE

## 2023-10-08 RX ORDER — FAMOTIDINE 20 MG/1
20 TABLET, FILM COATED ORAL ONCE
Status: COMPLETED | OUTPATIENT
Start: 2023-10-08 | End: 2023-10-08

## 2023-10-08 RX ORDER — DEXAMETHASONE SODIUM PHOSPHATE 10 MG/ML
10 INJECTION, SOLUTION INTRAMUSCULAR; INTRAVENOUS ONCE
Status: COMPLETED | OUTPATIENT
Start: 2023-10-08 | End: 2023-10-08

## 2023-10-08 RX ADMIN — FAMOTIDINE 20 MG: 20 TABLET ORAL at 13:17

## 2023-10-08 RX ADMIN — DEXAMETHASONE SODIUM PHOSPHATE 10 MG: 10 INJECTION, SOLUTION INTRAMUSCULAR; INTRAVENOUS at 14:10

## 2023-10-08 ASSESSMENT — ACTIVITIES OF DAILY LIVING (ADL): ADLS_ACUITY_SCORE: 33

## 2023-10-08 NOTE — DISCHARGE INSTRUCTIONS
Please make an appointment to follow up with Your Primary Care Provider and Primary Care Center (phone: [phone]). Return to the ER for worsening symptoms. Make take Benadryl as needed.

## 2023-10-08 NOTE — ED TRIAGE NOTES
Triage Assessment       Row Name 10/08/23 1123       Triage Assessment (Adult)    Airway WDL WDL       Respiratory WDL    Respiratory WDL WDL       Skin Circulation/Temperature WDL    Skin Circulation/Temperature WDL X  generalized rash       Cardiac WDL    Cardiac WDL WDL       Peripheral/Neurovascular WDL    Peripheral Neurovascular WDL WDL       Cognitive/Neuro/Behavioral WDL    Cognitive/Neuro/Behavioral WDL WDL

## 2023-10-08 NOTE — ED PROVIDER NOTES
ED Provider Note  [facility]      History     Chief Complaint   Patient presents with    Rash     Generalized rash, started yesterday with pruritus, rash started in upper back, woke up this morning with rash spreading, no fevers, no changes in diet, not using any new products, states she is taking a course of antibiotics for BV     HPI  22yo F no pmhx p/w rash x waking today. Reports yesterday was feeling a bit itchy. Took 50mg benadryl x2 (last dose last evening) then woke this morning with diffuse hives. No new soaps/lotions/detergents//foods. No known allergies. No airway involvement. No history of similar rash. No abd pain, n/v, f/c, cough; otherwise feeling normal. Does note that she is on the final day of Flagyl for  BV.     Past Medical History  No past medical history on file.  No past surgical history on file.  doxylamine (UNISOM) 25 MG TABS tablet  methocarbamol (ROBAXIN) 500 MG tablet  norgestimate-ethinyl estradiol (ORTHO-CYCLEN) 0.25-35 MG-MCG tablet  Prenatal Vit-Fe Fumarate-FA (PRENATAL MULTIVITAMIN W/IRON) 27-0.8 MG tablet  pyridOXINE (VITAMIN B6) 25 MG tablet      No Known Allergies  Family History  No family history on file.  Social History   Social History     Tobacco Use    Smoking status: Never     Passive exposure: Never    Smokeless tobacco: Never   Substance Use Topics    Alcohol use: Not Currently    Drug use: Not Currently         A medically appropriate review of systems was performed with pertinent positives and negatives noted in the HPI, and all other systems negative.    Physical Exam   BP: 91/64  Pulse: 101  Temp: 98.6  F (37  C)  Resp: 16  Weight: 58.5 kg (129 lb)  SpO2: 96 %  Physical Exam  Constitutional:       General: She is not in acute distress.     Appearance: Normal appearance. She is well-developed.   HENT:      Head: Normocephalic and atraumatic.   Eyes:      Conjunctiva/sclera: Conjunctivae normal.   Cardiovascular:      Rate and Rhythm: Normal  rate.   Pulmonary:      Effort: Pulmonary effort is normal.   Musculoskeletal:      Cervical back: Normal range of motion and neck supple.   Skin:     General: Skin is warm and dry.      Findings: Rash present.      Comments: Diffusely scattered, non-confluent, blanching urticarial rash to upper and lower extremities, anterior/posterior trunk and neck   Neurological:      Mental Status: She is alert and oriented to person, place, and time.           ED Course, Procedures, & Data      Procedures            No results found for any visits on 10/08/23.  Medications - No data to display  Labs Ordered and Resulted from Time of ED Arrival to Time of ED Departure - No data to display  No orders to display          Critical care was not performed.     Medical Decision Making  The patient's presentation was of moderate complexity (an undiagnosed new problem with uncertain diagnosis).    The patient's evaluation involved:  history and exam without other MDM data elements    The patient's management necessitated moderate risk (prescription drug management including medications given in the ED).    Assessment & Plan    24yo F no pmhx p/w rash x waking today. No new soaps/lotions/detergents//foods. No known allergies. No airway involvement. No history of similar rash. No systemic symptoms. Does note that she is on the final day of Flagyl for  BV.     In ED, HDS/AF, well appearing. Diffusely scattered, non-confluent, blanching urticarial rash to upper and lower extremities, anterior/posterior trunk and neck.     Not clearly drug rash, but advised discharge Flagyl. Low suspicion DRESS or SJS.    Pt requesting IM over PO steroids. Will give IM decadron and PO famotidine in ED. Discharged with instructions for PO benadryl PRN. Return precautions for worsening, PCP follow up PRN.     I have reviewed the nursing notes. I have reviewed the findings, diagnosis, plan and need for follow up with the patient.    New Prescriptions    No  medications on file       Final diagnoses:   Abril Li PA-C  [facility] EMERGENCY DEPARTMENT  10/8/2023     Luiz Li PA-C  10/08/23 1237

## 2023-10-10 ENCOUNTER — TELEPHONE (OUTPATIENT)
Dept: ALLERGY | Facility: CLINIC | Age: 23
End: 2023-10-10

## 2023-10-10 ENCOUNTER — HOSPITAL ENCOUNTER (EMERGENCY)
Facility: CLINIC | Age: 23
Discharge: HOME | End: 2023-10-10
Attending: EMERGENCY MEDICINE | Admitting: EMERGENCY MEDICINE
Payer: COMMERCIAL

## 2023-10-10 VITALS
HEART RATE: 82 BPM | BODY MASS INDEX: 20.25 KG/M2 | OXYGEN SATURATION: 98 % | TEMPERATURE: 98.4 F | SYSTOLIC BLOOD PRESSURE: 115 MMHG | DIASTOLIC BLOOD PRESSURE: 70 MMHG | WEIGHT: 129 LBS | HEIGHT: 67 IN | RESPIRATION RATE: 16 BRPM

## 2023-10-10 DIAGNOSIS — R21 RASH: ICD-10-CM

## 2023-10-10 LAB
ALBUMIN SERPL BCG-MCNC: 4.3 G/DL (ref 3.5–5.2)
ALBUMIN UR-MCNC: 20 MG/DL
ALBUMIN UR-MCNC: NEGATIVE MG/DL
ALP SERPL-CCNC: 53 U/L (ref 35–104)
ALT SERPL W P-5'-P-CCNC: 8 U/L (ref 0–50)
ANION GAP SERPL CALCULATED.3IONS-SCNC: 11 MMOL/L (ref 7–15)
APPEARANCE UR: ABNORMAL
APPEARANCE UR: CLEAR
AST SERPL W P-5'-P-CCNC: 10 U/L (ref 0–45)
BACTERIA #/AREA URNS HPF: ABNORMAL /HPF
BACTERIA #/AREA URNS HPF: ABNORMAL /HPF
BASO+EOS+MONOS # BLD AUTO: NORMAL 10*3/UL
BASO+EOS+MONOS NFR BLD AUTO: NORMAL %
BASOPHILS # BLD AUTO: 0 10E3/UL (ref 0–0.2)
BASOPHILS NFR BLD AUTO: 0 %
BILIRUB SERPL-MCNC: 0.2 MG/DL
BILIRUB UR QL STRIP: NEGATIVE
BILIRUB UR QL STRIP: NEGATIVE
BUN SERPL-MCNC: 12.1 MG/DL (ref 6–20)
CALCIUM SERPL-MCNC: 8.8 MG/DL (ref 8.6–10)
CHLORIDE SERPL-SCNC: 107 MMOL/L (ref 98–107)
COLOR UR AUTO: ABNORMAL
COLOR UR AUTO: YELLOW
CREAT SERPL-MCNC: 0.65 MG/DL (ref 0.51–0.95)
DEPRECATED HCO3 PLAS-SCNC: 23 MMOL/L (ref 22–29)
EGFRCR SERPLBLD CKD-EPI 2021: >90 ML/MIN/1.73M2
EOSINOPHIL # BLD AUTO: 0 10E3/UL (ref 0–0.7)
EOSINOPHIL NFR BLD AUTO: 0 %
ERYTHROCYTE [DISTWIDTH] IN BLOOD BY AUTOMATED COUNT: 11.6 % (ref 10–15)
GLUCOSE SERPL-MCNC: 90 MG/DL (ref 70–99)
GLUCOSE UR STRIP-MCNC: NEGATIVE MG/DL
GLUCOSE UR STRIP-MCNC: NEGATIVE MG/DL
HCG SERPL QL: NEGATIVE
HCT VFR BLD AUTO: 36.3 % (ref 35–47)
HGB BLD-MCNC: 12.4 G/DL (ref 11.7–15.7)
HGB UR QL STRIP: NEGATIVE
HGB UR QL STRIP: NEGATIVE
IMM GRANULOCYTES # BLD: 0 10E3/UL
IMM GRANULOCYTES NFR BLD: 0 %
KETONES UR STRIP-MCNC: ABNORMAL MG/DL
KETONES UR STRIP-MCNC: NEGATIVE MG/DL
LEUKOCYTE ESTERASE UR QL STRIP: ABNORMAL
LEUKOCYTE ESTERASE UR QL STRIP: ABNORMAL
LYMPHOCYTES # BLD AUTO: 1.8 10E3/UL (ref 0.8–5.3)
LYMPHOCYTES NFR BLD AUTO: 18 %
MCH RBC QN AUTO: 32.3 PG (ref 26.5–33)
MCHC RBC AUTO-ENTMCNC: 34.2 G/DL (ref 31.5–36.5)
MCV RBC AUTO: 95 FL (ref 78–100)
MONOCYTES # BLD AUTO: 0.8 10E3/UL (ref 0–1.3)
MONOCYTES NFR BLD AUTO: 8 %
MUCOUS THREADS #/AREA URNS LPF: PRESENT /LPF
MUCOUS THREADS #/AREA URNS LPF: PRESENT /LPF
NEUTROPHILS # BLD AUTO: 7.3 10E3/UL (ref 1.6–8.3)
NEUTROPHILS NFR BLD AUTO: 74 %
NITRATE UR QL: NEGATIVE
NITRATE UR QL: NEGATIVE
NRBC # BLD AUTO: 0 10E3/UL
NRBC BLD AUTO-RTO: 0 /100
PH UR STRIP: 5.5 [PH] (ref 5–7)
PH UR STRIP: 6 [PH] (ref 5–7)
PLATELET # BLD AUTO: 247 10E3/UL (ref 150–450)
POTASSIUM SERPL-SCNC: 3.3 MMOL/L (ref 3.4–5.3)
PROT SERPL-MCNC: 7.1 G/DL (ref 6.4–8.3)
RBC # BLD AUTO: 3.84 10E6/UL (ref 3.8–5.2)
RBC URINE: 1 /HPF
RBC URINE: <1 /HPF
SODIUM SERPL-SCNC: 141 MMOL/L (ref 135–145)
SP GR UR STRIP: 1.01 (ref 1–1.03)
SP GR UR STRIP: 1.02 (ref 1–1.03)
SQUAMOUS EPITHELIAL: 1 /HPF
SQUAMOUS EPITHELIAL: 18 /HPF
UROBILINOGEN UR STRIP-MCNC: NORMAL MG/DL
UROBILINOGEN UR STRIP-MCNC: NORMAL MG/DL
WBC # BLD AUTO: 10 10E3/UL (ref 4–11)
WBC URINE: 138 /HPF
WBC URINE: 17 /HPF

## 2023-10-10 PROCEDURE — 96361 HYDRATE IV INFUSION ADD-ON: CPT

## 2023-10-10 PROCEDURE — 87088 URINE BACTERIA CULTURE: CPT | Performed by: EMERGENCY MEDICINE

## 2023-10-10 PROCEDURE — 81001 URINALYSIS AUTO W/SCOPE: CPT | Performed by: EMERGENCY MEDICINE

## 2023-10-10 PROCEDURE — 250N000011 HC RX IP 250 OP 636: Mod: JZ | Performed by: EMERGENCY MEDICINE

## 2023-10-10 PROCEDURE — 96375 TX/PRO/DX INJ NEW DRUG ADDON: CPT | Performed by: EMERGENCY MEDICINE

## 2023-10-10 PROCEDURE — 250N000013 HC RX MED GY IP 250 OP 250 PS 637: Performed by: EMERGENCY MEDICINE

## 2023-10-10 PROCEDURE — 99284 EMERGENCY DEPT VISIT MOD MDM: CPT | Mod: 25 | Performed by: EMERGENCY MEDICINE

## 2023-10-10 PROCEDURE — 96365 THER/PROPH/DIAG IV INF INIT: CPT | Performed by: EMERGENCY MEDICINE

## 2023-10-10 PROCEDURE — 36415 COLL VENOUS BLD VENIPUNCTURE: CPT | Performed by: EMERGENCY MEDICINE

## 2023-10-10 PROCEDURE — 84703 CHORIONIC GONADOTROPIN ASSAY: CPT | Performed by: EMERGENCY MEDICINE

## 2023-10-10 PROCEDURE — 99284 EMERGENCY DEPT VISIT MOD MDM: CPT | Performed by: EMERGENCY MEDICINE

## 2023-10-10 PROCEDURE — 80053 COMPREHEN METABOLIC PANEL: CPT | Performed by: EMERGENCY MEDICINE

## 2023-10-10 PROCEDURE — 85014 HEMATOCRIT: CPT | Performed by: EMERGENCY MEDICINE

## 2023-10-10 PROCEDURE — 258N000003 HC RX IP 258 OP 636: Performed by: EMERGENCY MEDICINE

## 2023-10-10 RX ORDER — POTASSIUM CHLORIDE 1.5 G/1.58G
40 POWDER, FOR SOLUTION ORAL ONCE
Status: DISCONTINUED | OUTPATIENT
Start: 2023-10-10 | End: 2023-10-10

## 2023-10-10 RX ORDER — DIPHENHYDRAMINE HYDROCHLORIDE 50 MG/ML
25 INJECTION INTRAMUSCULAR; INTRAVENOUS ONCE
Status: COMPLETED | OUTPATIENT
Start: 2023-10-10 | End: 2023-10-10

## 2023-10-10 RX ORDER — PREDNISONE 20 MG/1
TABLET ORAL
Qty: 10 TABLET | Refills: 0 | Status: SHIPPED | OUTPATIENT
Start: 2023-10-10 | End: 2023-12-07

## 2023-10-10 RX ORDER — METHYLPREDNISOLONE SODIUM SUCCINATE 125 MG/2ML
125 INJECTION, POWDER, LYOPHILIZED, FOR SOLUTION INTRAMUSCULAR; INTRAVENOUS ONCE
Status: COMPLETED | OUTPATIENT
Start: 2023-10-10 | End: 2023-10-10

## 2023-10-10 RX ORDER — POTASSIUM CHLORIDE 1.5 G/1.58G
40 POWDER, FOR SOLUTION ORAL ONCE
Status: COMPLETED | OUTPATIENT
Start: 2023-10-10 | End: 2023-10-10

## 2023-10-10 RX ADMIN — FAMOTIDINE 20 MG: 20 INJECTION, SOLUTION INTRAVENOUS at 09:00

## 2023-10-10 RX ADMIN — POTASSIUM CHLORIDE 40 MEQ: 1.5 POWDER, FOR SOLUTION ORAL at 10:10

## 2023-10-10 RX ADMIN — METHYLPREDNISOLONE SODIUM SUCCINATE 125 MG: 125 INJECTION, POWDER, FOR SOLUTION INTRAMUSCULAR; INTRAVENOUS at 08:59

## 2023-10-10 RX ADMIN — SODIUM CHLORIDE 1000 ML: 9 INJECTION, SOLUTION INTRAVENOUS at 08:58

## 2023-10-10 RX ADMIN — DIPHENHYDRAMINE HYDROCHLORIDE 25 MG: 50 INJECTION, SOLUTION INTRAMUSCULAR; INTRAVENOUS at 08:59

## 2023-10-10 ASSESSMENT — ACTIVITIES OF DAILY LIVING (ADL)
ADLS_ACUITY_SCORE: 35
ADLS_ACUITY_SCORE: 35

## 2023-10-10 NOTE — TELEPHONE ENCOUNTER
This encounter is being sent to inform the clinic that this patient has a referral from Arthur Escudero MD for the diagnoses of Referral Order says Rash; Hives - but Pt said it is only Hives - and note in chart says - Seen in the ED over the weekend, woke up this morning and has hives all over body. and has requested that this patient be seen within Priority: 1-2 Weeks and/or with Priority: 1-2 Weeks.  Based on the availability of our provider(s), we are unable to accommodate this request.    Were all sites offered this patient?  Yes  Pt willing to go to either [place] or [place]  Does scheduling algorithm request to schedule next available?  Patient appointment has not been scheduled.  Please review the referral request for accommodation and contact the patient.  If unable to accommodate, please resubmit a referral and indicate a preferred partner or affiliate location using Provider Finder or Scheduling Instructions field.     Pt said this is not a Rash and is for Hives - and is for Allergy clinic and not to be seen by Dermatology for Rash    Our protocols say Allergy sees for Hives but not Rash unless seen by Dermatology first    Please review and call Pt to let her know if you can see her or not for her Hives    Pt said Records all in Epic - no outside Records    She had to go to ED for this she said - they recommended seeing an Allergist to see if she is having an Allergic reaction    Please reach out to Pt - Thank you    Sending to [place] first - and if cannot see Pt - please have [place] review - Pt requests to be seen ASAP even if cannot be within 1-2 weeks - Thank you!

## 2023-10-10 NOTE — ED PROVIDER NOTES
ED PROVIDER NOTE  October 10, 2023  History     Chief Complaint   Patient presents with    Rash     Seen in the ED over the weekend, woke up this morning and has hives all over body.      HPI  Elsi Eagle is a 23 year old female who arrives today to the emergency department for evaluation of a rash.  This patient was seen 2 days ago by our FEMI in the emergency department.  I also did have a brief chance for a second look at the rash.  At that time urticarial.  Of note patient expresses significant frustration this morning to nursing staff and to me as well stating nobody listened.  Patient states that the only thing that works is IV steroids.  I did have a brief discussion with the patient the other day regarding how steroids work.  At that time patient requested IM medications for treatment which were administered as per patient request.  She states that she has had a recurrence of urticarial rash.  She is no longer in any Flagyl or antibiotics.  No new exposures to detergents.  No viral syndrome.  No involvement of oral mucosa.      Past Medical History  History reviewed. No pertinent past medical history.  History reviewed. No pertinent surgical history.  predniSONE (DELTASONE) 20 MG tablet  doxylamine (UNISOM) 25 MG TABS tablet  methocarbamol (ROBAXIN) 500 MG tablet  norgestimate-ethinyl estradiol (ORTHO-CYCLEN) 0.25-35 MG-MCG tablet  Prenatal Vit-Fe Fumarate-FA (PRENATAL MULTIVITAMIN W/IRON) 27-0.8 MG tablet  pyridOXINE (VITAMIN B6) 25 MG tablet      No Known Allergies  Family History  History reviewed. No pertinent family history.  Social History   Social History     Tobacco Use    Smoking status: Never     Passive exposure: Never    Smokeless tobacco: Never   Substance Use Topics    Alcohol use: Not Currently    Drug use: Not Currently         A medically appropriate review of systems was performed with pertinent positives and negatives noted in the HPI, and all other systems negative.      Physical  "Exam   BP: 106/73  Pulse: 60  Temp: 97.9  F (36.6  C)  Resp: 16  Height: 170.2 cm (5' 7\")  Weight: 58.5 kg (129 lb)  SpO2: 97 %      Physical Exam  Vitals and nursing note reviewed.   Constitutional:       General: She is not in acute distress.  Cardiovascular:      Rate and Rhythm: Normal rate.   Pulmonary:      Effort: Pulmonary effort is normal. No respiratory distress.      Breath sounds: No stridor.   Skin:     Findings: Rash present. Rash is urticarial.   Neurological:      General: No focal deficit present.      Mental Status: She is alert.   Psychiatric:         Mood and Affect: Mood normal.         Behavior: Behavior normal.         ED Course        Procedures         Results for orders placed or performed during the hospital encounter of 10/10/23 (from the past 24 hour(s))   Comprehensive metabolic panel   Result Value Ref Range    Sodium 141 135 - 145 mmol/L    Potassium 3.3 (L) 3.4 - 5.3 mmol/L    Carbon Dioxide (CO2) 23 22 - 29 mmol/L    Anion Gap 11 7 - 15 mmol/L    Urea Nitrogen 12.1 6.0 - 20.0 mg/dL    Creatinine 0.65 0.51 - 0.95 mg/dL    GFR Estimate >90 >60 mL/min/1.73m2    Calcium 8.8 8.6 - 10.0 mg/dL    Chloride 107 98 - 107 mmol/L    Glucose 90 70 - 99 mg/dL    Alkaline Phosphatase 53 35 - 104 U/L    AST 10 0 - 45 U/L    ALT 8 0 - 50 U/L    Protein Total 7.1 6.4 - 8.3 g/dL    Albumin 4.3 3.5 - 5.2 g/dL    Bilirubin Total 0.2 <=1.2 mg/dL   CBC with platelets differential    Narrative    The following orders were created for panel order CBC with platelets differential.  Procedure                               Abnormality         Status                     ---------                               -----------         ------                     CBC with platelets and d...[[id number]]                      Final result                 Please view results for these tests on the individual orders.   HCG qualitative Blood   Result Value Ref Range    hCG Serum Qualitative Negative Negative   CBC with " platelets and differential   Result Value Ref Range    WBC Count 10.0 4.0 - 11.0 10e3/uL    RBC Count 3.84 3.80 - 5.20 10e6/uL    Hemoglobin 12.4 11.7 - 15.7 g/dL    Hematocrit 36.3 35.0 - 47.0 %    MCV 95 78 - 100 fL    MCH 32.3 26.5 - 33.0 pg    MCHC 34.2 31.5 - 36.5 g/dL    RDW 11.6 10.0 - 15.0 %    Platelet Count 247 150 - 450 10e3/uL    % Neutrophils 74 %    % Lymphocytes 18 %    % Monocytes 8 %    Mids % (Monos, Eos, Basos)      % Eosinophils 0 %    % Basophils 0 %    % Immature Granulocytes 0 %    NRBCs per 100 WBC 0 <1 /100    Absolute Neutrophils 7.3 1.6 - 8.3 10e3/uL    Absolute Lymphocytes 1.8 0.8 - 5.3 10e3/uL    Absolute Monocytes 0.8 0.0 - 1.3 10e3/uL    Mids Abs (Monos, Eos, Basos)      Absolute Eosinophils 0.0 0.0 - 0.7 10e3/uL    Absolute Basophils 0.0 0.0 - 0.2 10e3/uL    Absolute Immature Granulocytes 0.0 <=0.4 10e3/uL    Absolute NRBCs 0.0 10e3/uL   UA with Microscopic reflex to Culture    Specimen: Urine, Clean Catch   Result Value Ref Range    Color Urine Yellow Colorless, Straw, Light Yellow, Yellow    Appearance Urine Slightly Cloudy (A) Clear    Glucose Urine Negative Negative mg/dL    Bilirubin Urine Negative Negative    Ketones Urine Trace (A) Negative mg/dL    Specific Gravity Urine 1.023 1.003 - 1.035    Blood Urine Negative Negative    pH Urine 5.5 5.0 - 7.0    Protein Albumin Urine 20 (A) Negative mg/dL    Urobilinogen Urine Normal Normal, 2.0 mg/dL    Nitrite Urine Negative Negative    Leukocyte Esterase Urine Large (A) Negative    Bacteria Urine Few (A) None Seen /HPF    Mucus Urine Present (A) None Seen /LPF    RBC Urine 1 <=2 /HPF    WBC Urine 138 (H) <=5 /HPF    Squamous Epithelials Urine 18 (H) <=1 /HPF    Narrative    Urine Culture ordered based on laboratory criteria   UA with Microscopic reflex to Culture    Specimen: Urine, Clean Catch   Result Value Ref Range    Color Urine Light Yellow Colorless, Straw, Light Yellow, Yellow    Appearance Urine Clear Clear    Glucose Urine  Negative Negative mg/dL    Bilirubin Urine Negative Negative    Ketones Urine Negative Negative mg/dL    Specific Gravity Urine 1.009 1.003 - 1.035    Blood Urine Negative Negative    pH Urine 6.0 5.0 - 7.0    Protein Albumin Urine Negative Negative mg/dL    Urobilinogen Urine Normal Normal, 2.0 mg/dL    Nitrite Urine Negative Negative    Leukocyte Esterase Urine Trace (A) Negative    Bacteria Urine Few (A) None Seen /HPF    Mucus Urine Present (A) None Seen /LPF    RBC Urine <1 <=2 /HPF    WBC Urine 17 (H) <=5 /HPF    Squamous Epithelials Urine 1 <=1 /HPF    Narrative    Urine Culture ordered based on laboratory criteria     Medications   methylPREDNISolone sodium succinate (solu-MEDROL) injection 125 mg (125 mg Intravenous $Given 10/10/23 0859)   diphenhydrAMINE (BENADRYL) injection 25 mg (25 mg Intravenous $Given 10/10/23 0859)   famotidine (PEPCID) infusion 20 mg (0 mg Intravenous Stopped 10/10/23 0930)   sodium chloride 0.9% BOLUS 1,000 mL (0 mLs Intravenous Stopped 10/10/23 1045)   potassium chloride (KLOR-CON) Packet 40 mEq (40 mEq Oral $Given 10/10/23 1010)             Critical care was not performed.     Medical Decision Making  The patient's presentation was of moderate complexity (an acute complicated injury).    The patient's evaluation involved:  review of external note(s) from 2 sources (see separate area of note for details)    The patient's management necessitated moderate risk (prescription drug management including medications given in the ED).    Assessments & Plan (with Medical Decision Making)     Elsi Eagle is a 23 year old female who arrives today to the emergency department for evaluation of a rash.  Upon arrival patient to be alert, afebrile, hemodynamically stable.  She is seated upright in bed and appears nontoxic.  These appear to be urticarial primarily in the upper chest and back.  As per HPI patient expresses significant frustration that she did not receive IV steroids.  At this  time I do not believe she requires these however for patient satisfaction, I will administer IV access per her request for steroids and Benadryl.  Cause unclear.  She has had this multiple times in the past primarily associated with pregnancy.  I did recommend pregnancy testing as this has been the cause in the past.  Low suspicion for TN, SJS, or drug eruption rash.    Laboratory studies reviewed demonstrate mild hypokalemia.  Patient received supplemental potassium chloride in the emergency department and discussion regarding optimization of dietary intake.  I would recommend outpatient follow-up with PCP in approximately 2 weeks for recheck.  I did attempt to search for other potential causes in the past patient has had a rash related to pregnancy.  Her pregnancy test is negative.  Initial urinalysis appears to be contaminated I did send a second clean sample demonstrates no obvious signs of bacterial infection.  Patient is asymptomatic I would hold on any antibiotics at this time in favor monitoring of culture.  With regard to rash this is significantly improved in the emergency department I did prescribe a short course of prednisone.  Secondary to recurrence of symptoms I do believe she benefit from outpatient nonemergent allergist evaluation.  Should she have change, progression or worsening of symptoms we will have her call or return emergently for reevaluation.    I have reviewed the nursing notes.    I have reviewed the findings, diagnosis, plan and need for follow up with the patient.    New Prescriptions    PREDNISONE (DELTASONE) 20 MG TABLET    Take two tablets (= 40mg) each day for 5 (five) days       Final diagnoses:   Rash       ARTHUR WILHELM MD    10/10/2023   formerly [facility] EMERGENCY DEPARTMENT     Arthur Wilhelm MD  10/10/23 5554

## 2023-10-10 NOTE — DISCHARGE INSTRUCTIONS
I was happy to see that you have had improvement of your symptoms in the emergency department.  As noted your potassium was slightly low.  This oftentimes may be treated by changing her diet slightly.  Oftentimes a banana a day, leafy greens, glass of orange juice or grapefruit juice will treat this and tends to adjust with otherwise healthy kidneys.  I would plan however in the next several weeks to recheck with your primary care physician for redraw of your potassium.  Regarding a rash the cause is unclear at this time.  Many triggers such as viruses, bacteria, medications, detergents, soaps can cause this.  This does not sound to be the case with you.  We did send a urine sample.  At this time this does not demonstrate signs of obvious infection.  We will send for formal culture to reevaluate.  Should this turn positive we will call you with results.  Otherwise prescription for steroids has been sent.  Secondary to the recurrence of symptoms I did place an outpatient follow-up with an allergist for you.  Should you have change, progression or worsening symptoms were certainly happy to reevaluate you emergently at any time.

## 2023-10-10 NOTE — LETTER
2000      To Whom it may concern:    Elsi Eagle was seen in our Emergency Department today, 10/10/23 for an acute medical illness.  She should be excused from work from October 8 through October 10.  She may return to work on as tolerated basis pending improvement of symptoms.    Sincerely,    Dr. Arthur Escudero M.D.

## 2023-10-10 NOTE — ED TRIAGE NOTES
Patient claims oral benadryl doesn't work for her.      Triage Assessment       Row Name 10/10/23 0802       Triage Assessment (Adult)    Airway WDL WDL       Respiratory WDL    Respiratory WDL WDL       Skin Circulation/Temperature WDL    Skin Circulation/Temperature WDL X  rash allover her body

## 2023-10-12 ENCOUNTER — TELEPHONE (OUTPATIENT)
Dept: EMERGENCY MEDICINE | Facility: CLINIC | Age: 23
End: 2023-10-12
Payer: COMMERCIAL

## 2023-10-12 LAB
BACTERIA UR CULT: ABNORMAL

## 2023-10-12 RX ORDER — CEPHALEXIN 500 MG/1
500 CAPSULE ORAL 3 TIMES DAILY
Qty: 15 CAPSULE | Refills: 0 | Status: CANCELLED | OUTPATIENT
Start: 2023-10-12 | End: 2023-10-17

## 2023-10-12 NOTE — TELEPHONE ENCOUNTER
[facility] Emergency Department/Urgent Care Lab result notification  [Note:  ED Lab Results RN will reference the [facility] Emergency Dept visit note prior to contacting patient AND/OR prior to consulting Emergency Dept Provider.  Highlights of Emergency Dept visit in information summary at the bottom of this telephone note]    1. Reason for call  Notify of lab results  Assess patient symptoms [if necessary]  Review ED Providers recommendations/discharge instructions (if necessary)  Advise per [facility] ED lab result protocol    2. Lab Result (including Rx patient on, if applicable).  If culture, copy of lab report at bottom.  Preliminary urine culture report on 10/12/23 shows the presence of bacteria(s):  50,000 - 100,000 CFU/ML Proteus mirabilis and   10,000 - 50,000 CFU/mL Proteus mirabilis  Emergency Dept/Urgent Care discharge antibiotic: None  Recommendations per [facility] Lab result Urine culture protocol.    3. RN Assessment (Patient's current Symptoms):  Time of call: 1315 attempt.     4. RN Recommendations/Instructions per [place] ED lab result protocol  [facility] ED lab result protocol used: Urine culture  Unable to leave voicemail message requesting a call back to [facility] ED Lab Result RN at [phone].  RN is available every day between 9 a.m. and 5:30 p.m.    Information summary from Emergency Dept/Urgent Care visit on 10/10/23  Symptoms reported at ED/UC visit (Chief complaint, HPI)      Chief Complaint   Patient presents with    Rash       Seen in the ED over the weekend, woke up this morning and has hives all over body.       HPI  Elsi Eagle is a 23 year old female who arrives today to the emergency department for evaluation of a rash.  This patient was seen 2 days ago by our FEMI in the emergency department.  I also did have a brief chance for a second look at the rash.  At that time urticarial.  Of note patient expresses significant  frustration this morning to nursing staff and to me as well stating nobody listened.  Patient states that the only thing that works is IV steroids.  I did have a brief discussion with the patient the other day regarding how steroids work.  At that time patient requested IM medications for treatment which were administered as per patient request.  She states that she has had a recurrence of urticarial rash.  She is no longer in any Flagyl or antibiotics.  No new exposures to detergents.  No viral syndrome.  No involvement of oral mucosa.   Significant Medical hx, if applicable (i.e. CKD, diabetes) Reviewed   Allergies No Known Allergies   Weight, if applicable Wt Readings from Last 2 Encounters:   10/10/23 58.5 kg (129 lb)   10/08/23 58.5 kg (129 lb)      Coumadin/Warfarin [Yes /No] No   Creatinine Level (mg/dl) Creatinine   Date Value Ref Range Status   10/10/2023 0.65 0.51 - 0.95 mg/dL Final      Creatinine clearance (ml/min), if applicable Serum creatinine: 0.65 mg/dL 10/10/23 0830  Estimated creatinine clearance: 123.3 mL/min   Pregnant (Yes/No/NA) No, per ED lab   Breastfeeding (Yes/No/NA) ?   ED/UC Provider Impression and Plan (applicable information) Elsi Eagle is a 23 year old female who arrives today to the emergency department for evaluation of a rash.  Upon arrival patient to be alert, afebrile, hemodynamically stable.  She is seated upright in bed and appears nontoxic.  These appear to be urticarial primarily in the upper chest and back.  As per HPI patient expresses significant frustration that she did not receive IV steroids.  At this time I do not believe she requires these however for patient satisfaction, I will administer IV access per her request for steroids and Benadryl.  Cause unclear.  She has had this multiple times in the past primarily associated with pregnancy.  I did recommend pregnancy testing as this has been the cause in the past.  Low suspicion for TN, SJS, or drug eruption  rash.     Laboratory studies reviewed demonstrate mild hypokalemia.  Patient received supplemental potassium chloride in the emergency department and discussion regarding optimization of dietary intake.  I would recommend outpatient follow-up with PCP in approximately 2 weeks for recheck.  I did attempt to search for other potential causes in the past patient has had a rash related to pregnancy.  Her pregnancy test is negative.  Initial urinalysis appears to be contaminated I did send a second clean sample demonstrates no obvious signs of bacterial infection.  Patient is asymptomatic I would hold on any antibiotics at this time in favor monitoring of culture.  With regard to rash this is significantly improved in the emergency department I did prescribe a short course of prednisone.  Secondary to recurrence of symptoms I do believe she benefit from outpatient nonemergent allergist evaluation.  Should she have change, progression or worsening of symptoms we will have her call or return emergently for reevaluation.   ED/UC diagnosis Rash   ED/UC Provider   Arthur Escudero MD           Copy of Lab report (if applicable)        James Mahmood RN  [facility]  Emergency Dept Lab Result RN  Ph# [phone]

## 2023-10-13 NOTE — TELEPHONE ENCOUNTER
[facility] Emergency Department/Urgent Care Lab result notification  [Note:  ED Lab Results RN will reference the [facility] Emergency Dept visit note prior to contacting patient AND/OR prior to consulting Emergency Dept Provider.  Highlights of Emergency Dept visit in information summary at the bottom of this telephone note]    1. Reason for call  Notify of lab results  Assess patient symptoms [if necessary]  Review ED Providers recommendations/discharge instructions (if necessary)  Advise per [facility] ED lab result protocol    2. Lab Result (including Rx patient on, if applicable).  If culture, copy of lab report at bottom.  Final urine culture on 10/12/23 shows the presence of bacteria(s): 50,000-100,000 CFU/mL Proteus mirabilis and 10,000-50,000 CFU/mL Proteus mirabilis  [facility] Emergency Dept discharge antibiotic: None  Recommendations in treatment per [facility] ED lab result Urine Culture protocol.    3. RN Assessment (Patient's current Symptoms):  Time of call: 10:20am attempt made. Unable to leave a voicemail.     4. RN Recommendations/Instructions per [place] ED lab result protocol  [facility] ED lab result protocol used: Urine culture  Unable to leave voicemail message requesting a call back to [facility] ED Lab Result RN at [phone].  RN is available every day between 9 a.m. and 5:30 p.m.    Copy of Lab report (if applicable)        James Mahmood RN  [facility]  Emergency Dept Lab Result RN  Ph# [phone]

## 2023-10-13 NOTE — TELEPHONE ENCOUNTER
Called patient to offer a sooner appointment with Dr. Cervantes. Patient's voicemail is full and writer was not able to leave message for callback. Will try to reach patient via [employer] message as well.     Abigail Cook, ROSAN, RN

## 2023-10-14 NOTE — TELEPHONE ENCOUNTER
[facility] Emergency Department/Urgent Care Lab result notification  [Note:  ED Lab Results RN will reference the [facility] Emergency Dept visit note prior to contacting patient AND/OR prior to consulting Emergency Dept Provider.  Highlights of Emergency Dept visit in information summary at the bottom of this telephone note]    1. Reason for call  Notify of lab results  Assess patient symptoms [if necessary]  Review ED Providers recommendations/discharge instructions (if necessary)  Advise per [facility] ED lab result protocol    2. Lab Result (including Rx patient on, if applicable).  If culture, copy of lab report at bottom.  Final urine culture on 10/12/23 shows the presence of bacteria(s): 50,000-100,000 CFU/mL Proteus mirabilis and 10,000-50,000 CFU/mL Proteus mirabilis  [facility] Emergency Dept discharge antibiotic: None  Recommendations in treatment per [facility] ED lab result Urine Culture protocol.    3. RN Assessment (Patient's current Symptoms):  Time of call: 10/14/2023 10:32 AM  Assessment: patient presented with significant rash/urticaria possibly from allergy, no allergies on chart to antibiotics, she has tolerated cephalosporins in the past  Genitourinary symptoms:  No pain, burning, frequency, urgency  Fever:  None  Rash:  has been taking prednisone and benadryl daily, no hives/rash      4. RN Recommendations/Instructions per [place] ED lab result protocol  [facility] ED lab result protocol used: Urine culture  Patient was notified of lab result and treatment recommendations  RN reviewed information about if asymptomatic, no antibiotic treatment advised, UTI prevention and follow up with PCP in 7 days for retesting advised, sooner if symptoms present, return at anytime for worsening including fevers, flank pain, nausea/vomiting, or similarly for any allergy reactions swelling in face, lips, tongue, throat, breathing problems.  Encouraged journaling  about products and items within her space or use.      Patient Education on preventing future UTI's.  Practice good personal hygiene. Wipe yourself from front to back after using the toilet. This helps keep bacteria from getting into the urethra. Keep the genital area clean and dry.  Drink plenty of fluids, such as water, juice, or other caffeine-free drinks.  Drink at least 6-8 glasses a day (1 1/2 to 2 1/2 liters), unless you must restrict fluids for other medical reasons. This will force the medicine (antibiotic) into your urinary system and flush the bacteria out of your body. Cranberry juice has been shown to help clear out the bacteria.  Empty your bladder. Always empty your bladder when you feel the urge to urinate. And always urinate before going to sleep. Urine that stays in your bladder can lead to infection. Try to urinate before and after sex as well.  Wear cotton underwear and cotton-lined panty hose; avoid tight-fitting pants.  If you are on birth control pills and are having frequent bladder infections, discuss with your doctor.  Use condoms during sex. These help prevent UTIs caused by sexually transmitted bacteria. Also, avoid using spermicides during sex. These can increase the risk of UTIs. Choose other forms of birth control instead. For women who tend to get UTIs after sex, a low-dose of a preventive antibiotic may be used. Be sure to discuss this option with your health care provider.  Follow up with your health care provider as directed. He or she may test to make sure the infection has cleared. If necessary, additional treatment may be started.      5. Please Contact your PCP clinic or return to the Emergency department if your:  Symptoms return.  Symptoms worsen or other concerning symptoms.    Copy of Lab report (if applicable)  Urine Culture  Order: [id number] - Reflex for Order [id number]  Status: Final result       Visible to patient: Yes (seen)    Specimen Information: Urine, Clean Catch    4 Result Notes  Culture 50,000-100,000 CFU/mL Proteus mirabilis Abnormal            Resulting Agency: [facility]     Susceptibility    Proteus mirabilis (1)    Antibiotic Interpretation Sensitivity  Method Status    Ampicillin Susceptible <=2 ug/mL ROBEL Final    Ampicillin/ Sulbactam Susceptible <=2 ug/mL ROBEL Final    Piperacillin/Tazobactam Susceptible <=4 ug/mL ROBEL Final    Cefazolin Susceptible <=4 ug/mL ROBEL Final     Cefazolin ROBEL breakpoints are for the treatment of uncomplicated urinary tract infections. For the treatment of systemic infections, please contact the laboratory for additional testing.       Cefoxitin Susceptible <=4 ug/mL ROBEL Final    Ceftazidime Susceptible <=1 ug/mL ROBEL Final    Ceftriaxone Susceptible <=1 ug/mL ROBEL Final    Cefepime Susceptible <=1 ug/mL ROBEL Final    Gentamicin Susceptible <=1 ug/mL ROBEL Final    Tobramycin Susceptible <=1 ug/mL ROBEL Final    Ciprofloxacin Susceptible <=0.25 ug/mL ROBEL Final    Levofloxacin Susceptible <=0.12 ug/mL ROBEL Final    Nitrofurantoin Resistant 128 ug/mL ROBEL Final     Intrinsically Resistant       Trimethoprim/Sulfamethoxazole Susceptible <=1/19 ug/mL ROBEL Final          Specimen Collected: 10/10/23 10:51 AM Last Resulted: 10/12/23 10:19 PM               Nayan Wills RN  [facility]  Emergency Dept Lab Result RN  Ph# [phone]

## 2023-10-17 NOTE — TELEPHONE ENCOUNTER
Called patient to schedule a sooner appointment with Dr. Cervantes. Second attempt to reach patient and voicemail is still full. Will attempt to reach patient via [employer] to see if she is interested in moving her appointment up.    Abigail Cook, ROSAN, RN

## 2023-10-23 NOTE — TELEPHONE ENCOUNTER
Attempted to reach patient twice via telephone and was unsuccessful. Patient's voicemail is full and writer was unable to leave a message for callback. Third attempt made to reach patient via [employer], but patient has not read this message yet. No further attempts will be made to reach patient for sooner appointment.     Abigail Cook, ROSAN, RN

## 2023-11-16 ENCOUNTER — LAB (OUTPATIENT)
Dept: LAB | Facility: CLINIC | Age: 23
End: 2023-11-16
Payer: COMMERCIAL

## 2023-11-16 ENCOUNTER — E-VISIT (OUTPATIENT)
Dept: FAMILY MEDICINE | Facility: CLINIC | Age: 23
End: 2023-11-16
Payer: COMMERCIAL

## 2023-11-16 DIAGNOSIS — N92.6 MISSED PERIOD: Primary | ICD-10-CM

## 2023-11-16 DIAGNOSIS — N92.6 MISSED PERIOD: ICD-10-CM

## 2023-11-16 LAB — HCG UR QL: NEGATIVE

## 2023-11-16 PROCEDURE — 81025 URINE PREGNANCY TEST: CPT

## 2023-11-16 PROCEDURE — 99207 PR NO CHARGE LOS: CPT | Performed by: FAMILY MEDICINE

## 2023-11-16 NOTE — PATIENT INSTRUCTIONS
Thank you for choosing us for your care. Given your symptoms, I would like you to do a lab-only visit to determine what is causing them.  I have placed the orders.  Please schedule an appointment with the lab right here in [place], or call [phone].  I will let you know when the results are back and next steps to take.

## 2023-12-12 DIAGNOSIS — Z32.01 PREGNANCY TEST POSITIVE: Primary | ICD-10-CM

## 2023-12-13 ENCOUNTER — ANCILLARY PROCEDURE (OUTPATIENT)
Dept: ULTRASOUND IMAGING | Facility: CLINIC | Age: 23
End: 2023-12-13
Attending: ADVANCED PRACTICE MIDWIFE
Payer: COMMERCIAL

## 2023-12-13 DIAGNOSIS — Z32.01 PREGNANCY TEST POSITIVE: ICD-10-CM

## 2023-12-13 PROCEDURE — 76801 OB US < 14 WKS SINGLE FETUS: CPT

## 2023-12-13 PROCEDURE — 76801 OB US < 14 WKS SINGLE FETUS: CPT | Mod: 26 | Performed by: OBSTETRICS & GYNECOLOGY

## 2024-01-16 ENCOUNTER — E-VISIT (OUTPATIENT)
Dept: URGENT CARE | Facility: CLINIC | Age: 24
End: 2024-01-16
Payer: COMMERCIAL

## 2024-01-16 DIAGNOSIS — R10.84 ABDOMINAL PAIN, GENERALIZED: Primary | ICD-10-CM

## 2024-01-16 PROCEDURE — 99207 PR NON-BILLABLE SERV PER CHARTING: CPT | Performed by: FAMILY MEDICINE

## 2024-01-16 NOTE — PATIENT INSTRUCTIONS
Dear Denny Eagle,    We are sorry you are not feeling well. Based on the responses you provided, it is recommended that you be seen in-person in urgent care so we can better evaluate your symptoms. Please click here to find the nearest urgent care location to you.   You will not be charged for this Visit. Thank you for trusting us with your care.    Shanti Serrano MD

## 2024-02-13 ENCOUNTER — HOSPITAL ENCOUNTER (EMERGENCY)
Facility: CLINIC | Age: 24
Discharge: LEFT BEFORE DISCHARGE | End: 2024-02-13
Admitting: FAMILY MEDICINE

## 2024-02-13 VITALS
SYSTOLIC BLOOD PRESSURE: 95 MMHG | OXYGEN SATURATION: 98 % | TEMPERATURE: 98.8 F | BODY MASS INDEX: 20.03 KG/M2 | WEIGHT: 127.6 LBS | HEART RATE: 96 BPM | RESPIRATION RATE: 16 BRPM | DIASTOLIC BLOOD PRESSURE: 75 MMHG | HEIGHT: 67 IN

## 2024-02-13 DIAGNOSIS — Z53.21 PATIENT LEFT WITHOUT BEING SEEN: ICD-10-CM

## 2024-02-13 PROCEDURE — 99281 EMR DPT VST MAYX REQ PHY/QHP: CPT

## 2024-02-13 ASSESSMENT — ACTIVITIES OF DAILY LIVING (ADL): ADLS_ACUITY_SCORE: 33

## 2024-02-13 NOTE — ED TRIAGE NOTES
Triage Assessment (Adult)       Row Name 02/13/24 5455          Triage Assessment    Airway WDL WDL        Respiratory WDL    Respiratory WDL WDL        Skin Circulation/Temperature WDL    Skin Circulation/Temperature WDL WDL        Cardiac WDL    Cardiac WDL WDL        Peripheral/Neurovascular WDL    Peripheral Neurovascular WDL WDL        Cognitive/Neuro/Behavioral WDL    Cognitive/Neuro/Behavioral WDL WDL

## 2024-03-02 ENCOUNTER — HEALTH MAINTENANCE LETTER (OUTPATIENT)
Age: 24
End: 2024-03-02

## 2024-10-11 ENCOUNTER — OFFICE VISIT (OUTPATIENT)
Dept: OBGYN | Facility: CLINIC | Age: 24
End: 2024-10-11
Payer: COMMERCIAL

## 2024-10-11 VITALS
BODY MASS INDEX: 21.93 KG/M2 | OXYGEN SATURATION: 96 % | DIASTOLIC BLOOD PRESSURE: 87 MMHG | WEIGHT: 140 LBS | HEART RATE: 93 BPM | SYSTOLIC BLOOD PRESSURE: 133 MMHG

## 2024-10-11 DIAGNOSIS — R10.2 PELVIC PAIN IN FEMALE: Primary | ICD-10-CM

## 2024-10-11 DIAGNOSIS — N30.00 ACUTE CYSTITIS WITHOUT HEMATURIA: ICD-10-CM

## 2024-10-11 LAB
ALBUMIN UR-MCNC: NEGATIVE MG/DL
APPEARANCE UR: CLEAR
BACTERIA #/AREA URNS HPF: ABNORMAL /HPF
BILIRUB UR QL STRIP: NEGATIVE
CLUE CELLS: NORMAL
COLOR UR AUTO: YELLOW
GLUCOSE UR STRIP-MCNC: NEGATIVE MG/DL
HCG UR QL: NEGATIVE
HGB UR QL STRIP: NEGATIVE
KETONES UR STRIP-MCNC: NEGATIVE MG/DL
LEUKOCYTE ESTERASE UR QL STRIP: ABNORMAL
NITRATE UR QL: POSITIVE
PH UR STRIP: 6.5 [PH] (ref 5–7)
RBC #/AREA URNS AUTO: ABNORMAL /HPF
SP GR UR STRIP: 1.02 (ref 1–1.03)
TRICHOMONAS, WET PREP: NORMAL
UROBILINOGEN UR STRIP-ACNC: 0.2 E.U./DL
WBC #/AREA URNS AUTO: ABNORMAL /HPF
WBC'S/HIGH POWER FIELD, WET PREP: NORMAL
YEAST, WET PREP: NORMAL

## 2024-10-11 PROCEDURE — 81025 URINE PREGNANCY TEST: CPT | Performed by: OBSTETRICS & GYNECOLOGY

## 2024-10-11 PROCEDURE — 99204 OFFICE O/P NEW MOD 45 MIN: CPT | Performed by: OBSTETRICS & GYNECOLOGY

## 2024-10-11 PROCEDURE — 87086 URINE CULTURE/COLONY COUNT: CPT | Performed by: OBSTETRICS & GYNECOLOGY

## 2024-10-11 PROCEDURE — 99459 PELVIC EXAMINATION: CPT | Performed by: OBSTETRICS & GYNECOLOGY

## 2024-10-11 PROCEDURE — 81001 URINALYSIS AUTO W/SCOPE: CPT | Performed by: OBSTETRICS & GYNECOLOGY

## 2024-10-11 PROCEDURE — 87491 CHLMYD TRACH DNA AMP PROBE: CPT | Performed by: OBSTETRICS & GYNECOLOGY

## 2024-10-11 PROCEDURE — 87186 SC STD MICRODIL/AGAR DIL: CPT | Performed by: OBSTETRICS & GYNECOLOGY

## 2024-10-11 PROCEDURE — 87591 N.GONORRHOEAE DNA AMP PROB: CPT | Performed by: OBSTETRICS & GYNECOLOGY

## 2024-10-11 PROCEDURE — 87210 SMEAR WET MOUNT SALINE/INK: CPT | Performed by: OBSTETRICS & GYNECOLOGY

## 2024-10-11 RX ORDER — NITROFURANTOIN 25; 75 MG/1; MG/1
100 CAPSULE ORAL 2 TIMES DAILY
Qty: 10 CAPSULE | Refills: 0 | Status: SHIPPED | OUTPATIENT
Start: 2024-10-11 | End: 2024-10-16

## 2024-10-11 NOTE — PROGRESS NOTES
"[facility]- OBGYN    CC: pelvic pain    S:Denny Eagle is a 24 year old  who presents today for pelvic pain.  Patient reports for the past few weeks she has had random timing of pelvic pain.  She states the last time she had the pain was Wednesday.  She states she awoke from sleep, vomited, and then had severe left lower quadrant pain.  The pain does not last long.  She felt like \"something burst\" in her abdomen.  She thinks it has happened about 3-4 times since onset.  She is certain that the vomiting is first and then the pain follows retching.  She denies any fever, chills, body aches, vaginal pain, or abnormal vaginal discharge.       OBGYN Hx:   OB History    Para Term  AB Living   3 1 1 0 2 1   SAB IAB Ectopic Multiple Live Births   0 2 0 0 1      # Outcome Date GA Lbr Frandy/2nd Weight Sex Type Anes PTL Lv   3 IAB 23 6w0d             Birth Comments: D&C   2 IAB 2023           1 Term 10/13/22 38w6d  3.65 kg (8 lb 0.8 oz) M Vag-Spont   RONI      Birth Comments: Admitted at 6 cms in active labor. progressed quicly.  .. QBL 150cc      Name: JACKIE EAGLE      Apgar1: 9  Apgar5: 9       Patient's last menstrual period was 2023 (approximate).  Menses: intermittent random spotting on depo provera injection  Sexually active with male partner   Contraception: depo provera.  First dose 24.  Second dose 8/15/24  STD Hx:none  Pap hx:22 NILM.  She denies any history of abnormal pap smears     PMH:    Past Medical History:   Diagnosis Date    Asthma 2022       PSH: none    Meds:none  Allergies:NKDA  SH:Denies any tobacco or drug use.  Consumes 0-1 drinks per week.  FH: Denies any family history of cancer    O: Patient Vitals for the past 24 hrs:   BP Pulse SpO2 Weight   10/11/24 0817 133/87 93 96 % 63.5 kg (140 lb)   ]  Exam:  General- awake, alert, answering questions appropriately, appears comfortable  CV- regular rate  Lung- breathing comfortably on " room air  Patient verbally consented to pelvic exam including external visual exam, speculum exam, and bimanual exam.  Exam chaperone: Caleb Mueller MA  - normal appearing external genitalia, normal appearing vaginal mucosa, normal appearing cervix, moderate amount of thin white vaginal discharge.  On bimanual exam no bladder tenderness, slight discomfort with uterine palpation, uterus is mobile and 6 week sized.  No tenderness in the right adnexal region, no masses palpated in bilateral adnexa.  Left adnexa with moderate tenderness to palpation.      Imaging and Labs:  Results for orders placed or performed in visit on 10/11/24   Wet prep - Clinic Collect     Status: Normal    Specimen: Vagina; Swab   Result Value Ref Range    Trichomonas Absent Absent    Yeast Absent Absent    Clue Cells Absent Absent    WBCs/high power field None None       A&P: Denny Eagle is a 24 year old  who presents today for pelvic pain.    (R10.2) Pelvic pain in female  (primary encounter diagnosis)  Comment: Patient with recent onset episodes of acute LLQ pelvic pain associated with vomiting or retching.  Discussed potential gynecologic sources of pain such as infection, ovarian cysts, ruptured ovarian cysts, uterine.  Reviewed importance of ruling out pregnancy with UPT.  Discussed evaluation of structural contributors to pain with pelvic ultrasound.  Discussed ruling out UTI as contributor to pain.  Explained return precautions and red flag symptoms of ovarian torsion.  If all gyn work up is negative, then next step would be seeing family practice to evaluate for potential GI sources of pain.  Plan: NEISSERIA GONORRHOEA PCR, CHLAMYDIA TRACHOMATIS        PCR, Wet prep - Clinic Collect, HCG Qual, Urine        ([id number]), US Transvaginal Pelvic Non-OB, UA         with Microscopic reflex to Culture - lab         Collect, Pelvic exam        Patient to have follow up visit to review results of ultrasound.    Angy Carrasco  MD    Addendum:  Results for orders placed or performed in visit on 10/11/24   HCG Qual, Urine ([id number])     Status: Normal   Result Value Ref Range    hCG Urine Qualitative Negative Negative   UA with Microscopic reflex to Culture - lab collect     Status: Abnormal    Specimen: Urine, Midstream   Result Value Ref Range    Color Urine Yellow Colorless, Straw, Light Yellow, Yellow    Appearance Urine Clear Clear    Glucose Urine Negative Negative mg/dL    Bilirubin Urine Negative Negative    Ketones Urine Negative Negative mg/dL    Specific Gravity Urine 1.020 1.003 - 1.035    Blood Urine Negative Negative    pH Urine 6.5 5.0 - 7.0    Protein Albumin Urine Negative Negative mg/dL    Urobilinogen Urine 0.2 0.2, 1.0 E.U./dL    Nitrite Urine Positive (A) Negative    Leukocyte Esterase Urine Trace (A) Negative   Urine Microscopic Exam     Status: Abnormal   Result Value Ref Range    Bacteria Urine Many (A) None Seen /HPF    RBC Urine 0-2 0-2 /HPF /HPF    WBC Urine 0-5 0-5 /HPF /HPF   NEISSERIA GONORRHOEA PCR     Status: Normal    Specimen: Vagina; Swab   Result Value Ref Range    Neisseria gonorrhoeae Negative Negative   CHLAMYDIA TRACHOMATIS PCR     Status: Normal    Specimen: Vagina; Swab   Result Value Ref Range    Chlamydia trachomatis Negative Negative   Wet prep - Clinic Collect     Status: Normal    Specimen: Vagina; Swab   Result Value Ref Range    Trichomonas Absent Absent    Yeast Absent Absent    Clue Cells Absent Absent    WBCs/high power field None None   Urine Culture     Status: Abnormal    Specimen: Urine, Midstream   Result Value Ref Range    Culture >100,000 CFU/mL Escherichia coli (A)        Susceptibility    Escherichia coli - ROBEL     Ampicillin 4 Susceptible ug/mL     Ampicillin/ Sulbactam <=2 Susceptible ug/mL     Piperacillin/Tazobactam <=4 Susceptible ug/mL     Cefazolin* <=4 Susceptible ug/mL      * Cefazolin ROBEL breakpoints are for the treatment of uncomplicated urinary tract infections. For the  treatment of systemic infections, please contact the laboratory for additional testing.     Cefoxitin <=4 Susceptible ug/mL     Ceftazidime <=1 Susceptible ug/mL     Ceftriaxone <=1 Susceptible ug/mL     Cefepime <=1 Susceptible ug/mL     Gentamicin <=1 Susceptible ug/mL     Tobramycin <=1 Susceptible ug/mL     Ciprofloxacin >=4 Resistant ug/mL     Levofloxacin >=8 Resistant ug/mL     Nitrofurantoin <=16 Susceptible ug/mL     Trimethoprim/Sulfamethoxazole >16/304 Resistant ug/mL       (N30.00) Acute cystitis without hematuria  Comment: Patient with positive UA/UC.  Treatment below.    Plan: nitroFURantoin macrocrystal-monohydrate         (MACROBID) 100 MG capsule            Angy Carrasco MD

## 2024-10-12 LAB
BACTERIA UR CULT: ABNORMAL
C TRACH DNA SPEC QL NAA+PROBE: NEGATIVE
N GONORRHOEA DNA SPEC QL NAA+PROBE: NEGATIVE

## 2025-03-15 ENCOUNTER — HEALTH MAINTENANCE LETTER (OUTPATIENT)
Age: 25
End: 2025-03-15